# Patient Record
Sex: FEMALE | Race: WHITE | NOT HISPANIC OR LATINO | Employment: OTHER | URBAN - METROPOLITAN AREA
[De-identification: names, ages, dates, MRNs, and addresses within clinical notes are randomized per-mention and may not be internally consistent; named-entity substitution may affect disease eponyms.]

---

## 2017-11-28 PROBLEM — O09.522 ELDERLY MULTIGRAVIDA IN SECOND TRIMESTER: Status: ACTIVE | Noted: 2017-11-28

## 2017-11-28 PROBLEM — O30.042 DICHORIONIC DIAMNIOTIC TWIN PREGNANCY IN SECOND TRIMESTER: Status: ACTIVE | Noted: 2017-11-28

## 2017-12-13 PROBLEM — N89.8 VAGINAL DISCHARGE: Status: ACTIVE | Noted: 2017-12-13

## 2018-01-12 PROBLEM — O16.9 HYPERTENSION AFFECTING PREGNANCY: Status: ACTIVE | Noted: 2018-01-12

## 2018-01-13 PROBLEM — R10.9 ABDOMINAL CRAMPING: Status: ACTIVE | Noted: 2018-01-13

## 2018-01-15 PROBLEM — O16.2 HYPERTENSION AFFECTING PREGNANCY IN SECOND TRIMESTER: Status: ACTIVE | Noted: 2018-01-15

## 2018-01-29 PROBLEM — R80.9 PROTEIN IN URINE: Status: ACTIVE | Noted: 2018-01-29

## 2018-02-05 PROBLEM — O14.90 PRE-ECLAMPSIA: Status: ACTIVE | Noted: 2018-02-05

## 2018-03-02 PROBLEM — O47.9 IRREGULAR CONTRACTIONS: Status: ACTIVE | Noted: 2018-03-02

## 2018-03-14 PROBLEM — I10 HYPERTENSION: Status: ACTIVE | Noted: 2018-03-14

## 2018-10-31 ENCOUNTER — LAB VISIT (OUTPATIENT)
Dept: LAB | Facility: HOSPITAL | Age: 38
End: 2018-10-31
Attending: INTERNAL MEDICINE
Payer: COMMERCIAL

## 2018-10-31 ENCOUNTER — OFFICE VISIT (OUTPATIENT)
Dept: CARDIOLOGY | Facility: CLINIC | Age: 38
End: 2018-10-31
Payer: COMMERCIAL

## 2018-10-31 VITALS
BODY MASS INDEX: 29.95 KG/M2 | WEIGHT: 202.19 LBS | HEART RATE: 90 BPM | DIASTOLIC BLOOD PRESSURE: 74 MMHG | SYSTOLIC BLOOD PRESSURE: 111 MMHG | HEIGHT: 69 IN

## 2018-10-31 DIAGNOSIS — E01.0 THYROMEGALY: ICD-10-CM

## 2018-10-31 DIAGNOSIS — R07.9 CHEST PAIN, UNSPECIFIED TYPE: ICD-10-CM

## 2018-10-31 DIAGNOSIS — R00.2 PALPITATIONS: ICD-10-CM

## 2018-10-31 DIAGNOSIS — I34.1 MITRAL VALVE PROLAPSE: ICD-10-CM

## 2018-10-31 LAB
T4 FREE SERPL-MCNC: 1.04 NG/DL
TSH SERPL DL<=0.005 MIU/L-ACNC: 1.09 UIU/ML

## 2018-10-31 PROCEDURE — 36415 COLL VENOUS BLD VENIPUNCTURE: CPT | Mod: PO

## 2018-10-31 PROCEDURE — 99204 OFFICE O/P NEW MOD 45 MIN: CPT | Mod: S$GLB,,, | Performed by: INTERNAL MEDICINE

## 2018-10-31 PROCEDURE — 93000 ELECTROCARDIOGRAM COMPLETE: CPT | Mod: S$GLB,,, | Performed by: INTERNAL MEDICINE

## 2018-10-31 PROCEDURE — 84443 ASSAY THYROID STIM HORMONE: CPT

## 2018-10-31 PROCEDURE — 99999 PR PBB SHADOW E&M-EST. PATIENT-LVL III: CPT | Mod: PBBFAC,,, | Performed by: INTERNAL MEDICINE

## 2018-10-31 PROCEDURE — 84439 ASSAY OF FREE THYROXINE: CPT

## 2018-10-31 PROCEDURE — 3008F BODY MASS INDEX DOCD: CPT | Mod: CPTII,S$GLB,, | Performed by: INTERNAL MEDICINE

## 2018-10-31 NOTE — PROGRESS NOTES
Subjective:    Patient ID:  Yokasta Pratt is a 38 y.o. female who presents for evaluation of No chief complaint on file.      Here for occasional palpitations. No other cardiac symptoms.         Review of Systems   Cardiovascular: Positive for palpitations.   All other systems reviewed and are negative.       Objective:      Physical Exam   Constitutional: She is oriented to person, place, and time. She appears well-developed and well-nourished.   HENT:   Head: Normocephalic and atraumatic.   Eyes: Conjunctivae are normal. Pupils are equal, round, and reactive to light. Right eye exhibits no discharge. Left eye exhibits no discharge. No scleral icterus.   Neck: Normal range of motion. Neck supple. No JVD present. No tracheal deviation present. Thyromegaly present.   Cardiovascular: Normal rate and regular rhythm. Exam reveals a midsystolic click.   Pulses:       Carotid pulses are 2+ on the right side, and 2+ on the left side.       Dorsalis pedis pulses are 2+ on the right side, and 2+ on the left side.        Posterior tibial pulses are 2+ on the right side, and 2+ on the left side.   Pulmonary/Chest: Effort normal and breath sounds normal. No stridor. No respiratory distress. She has no wheezes. She has no rales.   Abdominal: Soft. Bowel sounds are normal. She exhibits no distension. There is no tenderness. There is no rebound and no guarding.   Musculoskeletal: Normal range of motion. She exhibits no edema or tenderness.   Lymphadenopathy:     She has no cervical adenopathy.   Neurological: She is alert and oriented to person, place, and time.   Skin: Skin is warm and dry. No rash noted. No erythema. No pallor.   Psychiatric: She has a normal mood and affect. Her behavior is normal. Judgment and thought content normal.         Assessment:       1. Palpitations    2. Mitral valve prolapse    3. Thyromegaly         Plan:       Palpitations  -     TSH; Future; Expected date: 10/31/2018  -     T4, free;  Future; Expected date: 10/31/2018  -     Transthoracic echo (TTE) complete; Future    Mitral valve prolapse  -     Transthoracic echo (TTE) complete; Future    Thyromegaly  -     US Soft Tissue Head Neck Thyroid; Future; Expected date: 10/31/2018  -     TSH; Future; Expected date: 10/31/2018  -     T4, free; Future; Expected date: 10/31/2018    RTC in one months.

## 2018-11-07 ENCOUNTER — PATIENT MESSAGE (OUTPATIENT)
Dept: ADMINISTRATIVE | Facility: OTHER | Age: 38
End: 2018-11-07

## 2018-11-08 DIAGNOSIS — R07.9 CHEST PAIN, UNSPECIFIED TYPE: Primary | ICD-10-CM

## 2018-11-13 ENCOUNTER — CLINICAL SUPPORT (OUTPATIENT)
Dept: CARDIOLOGY | Facility: CLINIC | Age: 38
End: 2018-11-13
Attending: INTERNAL MEDICINE
Payer: COMMERCIAL

## 2018-11-13 ENCOUNTER — HOSPITAL ENCOUNTER (OUTPATIENT)
Dept: RADIOLOGY | Facility: HOSPITAL | Age: 38
Discharge: HOME | End: 2018-11-13
Attending: INTERNAL MEDICINE
Payer: COMMERCIAL

## 2018-11-13 VITALS
WEIGHT: 202 LBS | SYSTOLIC BLOOD PRESSURE: 115 MMHG | BODY MASS INDEX: 29.92 KG/M2 | HEART RATE: 80 BPM | DIASTOLIC BLOOD PRESSURE: 60 MMHG | HEIGHT: 69 IN

## 2018-11-13 DIAGNOSIS — E01.0 THYROMEGALY: ICD-10-CM

## 2018-11-13 DIAGNOSIS — R00.2 PALPITATIONS: ICD-10-CM

## 2018-11-13 DIAGNOSIS — I34.1 MITRAL VALVE PROLAPSE: ICD-10-CM

## 2018-11-13 PROCEDURE — 76536 US EXAM OF HEAD AND NECK: CPT | Mod: 26,,, | Performed by: RADIOLOGY

## 2018-11-13 PROCEDURE — 93306 TTE W/DOPPLER COMPLETE: CPT | Mod: S$GLB,,, | Performed by: INTERNAL MEDICINE

## 2018-11-13 PROCEDURE — 76536 US EXAM OF HEAD AND NECK: CPT | Mod: TC,PO

## 2018-11-13 PROCEDURE — 99999 PR PBB SHADOW E&M-EST. PATIENT-LVL II: CPT | Mod: PBBFAC,,,

## 2018-11-14 LAB
ASCENDING AORTA: 2.15 CM
AV MEAN GRADIENT: 3.96 MMHG
AV PEAK GRADIENT: 7.4 MMHG
BSA FOR ECHO PROCEDURE: 2.11 M2
CV ECHO LV RWT: 0.32 CM
DOP CALC AO PEAK VEL: 1.36 M/S
DOP CALC LVOT AREA: 3.49 CM2
DOP CALC LVOT DIAMETER: 2.11 CM
DOP CALC LVOT STROKE VOLUME: 48.54 CM3
DOP CALCLVOT PEAK VEL VTI: 13.89 CM
E WAVE DECELERATION TIME: 157.71 MSEC
E/A RATIO: 1.33
E/E' RATIO: 8
ECHO LV POSTERIOR WALL: 0.77 CM (ref 0.6–1.1)
FRACTIONAL SHORTENING: 30 % (ref 28–44)
INTERVENTRICULAR SEPTUM: 1.05 CM (ref 0.6–1.1)
IVRT: 0.09 MSEC
LA MAJOR: 4.21 CM
LA MINOR: 3.69 CM
LA WIDTH: 2.64 CM
LEFT ATRIUM SIZE: 3.17 CM
LEFT ATRIUM VOLUME INDEX: 13.3 ML/M2
LEFT ATRIUM VOLUME: 27.98 CM3
LEFT INTERNAL DIMENSION IN SYSTOLE: 3.36 CM (ref 2.1–4)
LEFT VENTRICLE DIASTOLIC VOLUME INDEX: 51.31 ML/M2
LEFT VENTRICLE DIASTOLIC VOLUME: 108.27 ML
LEFT VENTRICLE MASS INDEX: 71.3 G/M2
LEFT VENTRICLE SYSTOLIC VOLUME INDEX: 21.8 ML/M2
LEFT VENTRICLE SYSTOLIC VOLUME: 46.08 ML
LEFT VENTRICULAR INTERNAL DIMENSION IN DIASTOLE: 4.81 CM (ref 3.5–6)
LEFT VENTRICULAR MASS: 150.48 G
LV LATERAL E/E' RATIO: 6.8
LV SEPTAL E/E' RATIO: 9.71
MV PEAK A VEL: 0.51 M/S
MV PEAK E VEL: 0.68 M/S
PISA TR MAX VEL: 2.42 M/S
PULM VEIN S/D RATIO: 0.97
PV PEAK D VEL: 0.38 M/S
PV PEAK S VEL: 0.37 M/S
RA MAJOR: 4.29 CM
RA PRESSURE: 3 MMHG
RA WIDTH: 3.16 CM
RIGHT VENTRICULAR END-DIASTOLIC DIMENSION: 2.98 CM
RV TISSUE DOPPLER FREE WALL SYSTOLIC VELOCITY 1 (APICAL 4 CHAMBER VIEW): 9.64 M/S
SINUS: 2.83 CM
STJ: 2.26 CM
TDI LATERAL: 0.1
TDI SEPTAL: 0.07
TDI: 0.09
TR MAX PG: 23.43 MMHG
TRICUSPID ANNULAR PLANE SYSTOLIC EXCURSION: 1.59 CM
TV REST PULMONARY ARTERY PRESSURE: 26.43 MMHG

## 2018-11-15 ENCOUNTER — PATIENT MESSAGE (OUTPATIENT)
Dept: ADMINISTRATIVE | Facility: OTHER | Age: 38
End: 2018-11-15

## 2019-02-27 ENCOUNTER — LAB VISIT (OUTPATIENT)
Dept: LAB | Facility: HOSPITAL | Age: 39
End: 2019-02-27
Attending: EMERGENCY MEDICINE
Payer: COMMERCIAL

## 2019-02-27 DIAGNOSIS — E04.1 THYROID NODULE: ICD-10-CM

## 2019-02-27 DIAGNOSIS — E07.9 THYROID DYSFUNCTION: ICD-10-CM

## 2019-02-27 DIAGNOSIS — R53.83 FATIGUE: ICD-10-CM

## 2019-02-27 DIAGNOSIS — R00.2 PALPITATIONS: ICD-10-CM

## 2019-02-27 DIAGNOSIS — F41.9 ANXIETY: Primary | ICD-10-CM

## 2019-02-27 DIAGNOSIS — E34.9 HORMONE DISTURBANCE: ICD-10-CM

## 2019-02-27 LAB
25(OH)D3+25(OH)D2 SERPL-MCNC: 25 NG/ML
ALBUMIN SERPL BCP-MCNC: 4 G/DL
ALP SERPL-CCNC: 71 U/L
ALT SERPL W/O P-5'-P-CCNC: 27 U/L
ANION GAP SERPL CALC-SCNC: 6 MMOL/L
AST SERPL-CCNC: 21 U/L
BACTERIA #/AREA URNS HPF: ABNORMAL /HPF
BASOPHILS # BLD AUTO: 0.05 K/UL
BASOPHILS NFR BLD: 0.6 %
BILIRUB SERPL-MCNC: 0.9 MG/DL
BILIRUB UR QL STRIP: NEGATIVE
BUN SERPL-MCNC: 15 MG/DL
CALCIUM SERPL-MCNC: 9 MG/DL
CHLORIDE SERPL-SCNC: 106 MMOL/L
CHOLEST SERPL-MCNC: 189 MG/DL
CHOLEST/HDLC SERPL: 3.6 {RATIO}
CLARITY UR: CLEAR
CO2 SERPL-SCNC: 28 MMOL/L
COLOR UR: YELLOW
CORTIS SERPL-MCNC: 10.4 UG/DL
CREAT SERPL-MCNC: 0.7 MG/DL
DHEA-S SERPL-MCNC: 81.8 UG/DL
DIFFERENTIAL METHOD: NORMAL
EOSINOPHIL # BLD AUTO: 0.5 K/UL
EOSINOPHIL NFR BLD: 6 %
ERYTHROCYTE [DISTWIDTH] IN BLOOD BY AUTOMATED COUNT: 12.8 %
EST. GFR  (AFRICAN AMERICAN): >60 ML/MIN/1.73 M^2
EST. GFR  (NON AFRICAN AMERICAN): >60 ML/MIN/1.73 M^2
ESTIMATED AVG GLUCOSE: 103 MG/DL
ESTRADIOL SERPL-MCNC: 172 PG/ML
FERRITIN SERPL-MCNC: 32 NG/ML
FOLATE SERPL-MCNC: 14.6 NG/ML
FSH SERPL-ACNC: 6.5 MIU/ML
GLUCOSE SERPL-MCNC: 94 MG/DL
GLUCOSE UR QL STRIP: NEGATIVE
HBA1C MFR BLD HPLC: 5.2 %
HCT VFR BLD AUTO: 44.6 %
HDLC SERPL-MCNC: 53 MG/DL
HDLC SERPL: 28 %
HGB BLD-MCNC: 14.6 G/DL
HGB UR QL STRIP: NEGATIVE
IMM GRANULOCYTES # BLD AUTO: 0.02 K/UL
IMM GRANULOCYTES NFR BLD AUTO: 0.2 %
INSULIN COLLECTION INTERVAL: NORMAL
INSULIN SERPL-ACNC: 6.7 UU/ML
IRON SERPL-MCNC: 48 UG/DL
KETONES UR QL STRIP: NEGATIVE
LDLC SERPL CALC-MCNC: 124.2 MG/DL
LEUKOCYTE ESTERASE UR QL STRIP: NEGATIVE
LH SERPL-ACNC: 17.9 MIU/ML
LYMPHOCYTES # BLD AUTO: 3.1 K/UL
LYMPHOCYTES NFR BLD: 35.8 %
MAGNESIUM SERPL-MCNC: 2 MG/DL
MCH RBC QN AUTO: 28.4 PG
MCHC RBC AUTO-ENTMCNC: 32.7 G/DL
MCV RBC AUTO: 87 FL
MICROSCOPIC COMMENT: ABNORMAL
MONOCYTES # BLD AUTO: 0.5 K/UL
MONOCYTES NFR BLD: 5.6 %
NEUTROPHILS # BLD AUTO: 4.4 K/UL
NEUTROPHILS NFR BLD: 51.8 %
NITRITE UR QL STRIP: NEGATIVE
NONHDLC SERPL-MCNC: 136 MG/DL
NRBC BLD-RTO: 0 /100 WBC
PH UR STRIP: 8 [PH] (ref 5–8)
PHOSPHATE SERPL-MCNC: 3.1 MG/DL
PLATELET # BLD AUTO: 231 K/UL
PMV BLD AUTO: 10.9 FL
POTASSIUM SERPL-SCNC: 4.2 MMOL/L
PROGEST SERPL-MCNC: 0.3 NG/ML
PROLACTIN SERPL IA-MCNC: 6.9 NG/ML
PROT SERPL-MCNC: 6.5 G/DL
PROT UR QL STRIP: NEGATIVE
RBC # BLD AUTO: 5.14 M/UL
SODIUM SERPL-SCNC: 140 MMOL/L
SP GR UR STRIP: 1.01 (ref 1–1.03)
SQUAMOUS #/AREA URNS HPF: 10 /HPF
T3FREE SERPL-MCNC: 2.6 PG/ML
T4 FREE SERPL-MCNC: 1.17 NG/DL
TRIGL SERPL-MCNC: 59 MG/DL
TSH SERPL DL<=0.005 MIU/L-ACNC: 1.35 UIU/ML
URN SPEC COLLECT METH UR: NORMAL
VIT B12 SERPL-MCNC: 745 PG/ML
WBC # BLD AUTO: 8.51 K/UL
WBC #/AREA URNS HPF: 1 /HPF (ref 0–5)

## 2019-02-27 PROCEDURE — 80053 COMPREHEN METABOLIC PANEL: CPT

## 2019-02-27 PROCEDURE — 84144 ASSAY OF PROGESTERONE: CPT

## 2019-02-27 PROCEDURE — 82607 VITAMIN B-12: CPT

## 2019-02-27 PROCEDURE — 83735 ASSAY OF MAGNESIUM: CPT

## 2019-02-27 PROCEDURE — 84143 ASSAY OF 17-HYDROXYPREGNENO: CPT

## 2019-02-27 PROCEDURE — 82728 ASSAY OF FERRITIN: CPT

## 2019-02-27 PROCEDURE — 82040 ASSAY OF SERUM ALBUMIN: CPT

## 2019-02-27 PROCEDURE — 86800 THYROGLOBULIN ANTIBODY: CPT

## 2019-02-27 PROCEDURE — 83036 HEMOGLOBIN GLYCOSYLATED A1C: CPT

## 2019-02-27 PROCEDURE — 84100 ASSAY OF PHOSPHORUS: CPT

## 2019-02-27 PROCEDURE — 84270 ASSAY OF SEX HORMONE GLOBUL: CPT

## 2019-02-27 PROCEDURE — 84146 ASSAY OF PROLACTIN: CPT

## 2019-02-27 PROCEDURE — 82627 DEHYDROEPIANDROSTERONE: CPT

## 2019-02-27 PROCEDURE — 82746 ASSAY OF FOLIC ACID SERUM: CPT

## 2019-02-27 PROCEDURE — 84443 ASSAY THYROID STIM HORMONE: CPT

## 2019-02-27 PROCEDURE — 83525 ASSAY OF INSULIN: CPT

## 2019-02-27 PROCEDURE — 83002 ASSAY OF GONADOTROPIN (LH): CPT

## 2019-02-27 PROCEDURE — 81000 URINALYSIS NONAUTO W/SCOPE: CPT | Mod: PO

## 2019-02-27 PROCEDURE — 80061 LIPID PANEL: CPT

## 2019-02-27 PROCEDURE — 82306 VITAMIN D 25 HYDROXY: CPT

## 2019-02-27 PROCEDURE — 84439 ASSAY OF FREE THYROXINE: CPT

## 2019-02-27 PROCEDURE — 86376 MICROSOMAL ANTIBODY EACH: CPT

## 2019-02-27 PROCEDURE — 82670 ASSAY OF TOTAL ESTRADIOL: CPT

## 2019-02-27 PROCEDURE — 82533 TOTAL CORTISOL: CPT

## 2019-02-27 PROCEDURE — 83540 ASSAY OF IRON: CPT

## 2019-02-27 PROCEDURE — 84481 FREE ASSAY (FT-3): CPT

## 2019-02-27 PROCEDURE — 83001 ASSAY OF GONADOTROPIN (FSH): CPT

## 2019-02-27 PROCEDURE — 85025 COMPLETE CBC W/AUTO DIFF WBC: CPT

## 2019-02-27 PROCEDURE — 82679 ASSAY OF ESTRONE: CPT

## 2019-02-28 LAB
THYROGLOB AB SERPL IA-ACNC: <4 IU/ML
THYROPEROXIDASE IGG SERPL-ACNC: <6 IU/ML

## 2019-03-03 LAB
ALBUMIN SERPL-MCNC: 4.3 G/DL (ref 3.6–5.1)
SHBG SERPL-SCNC: 49 NMOL/L (ref 17–124)
TESTOST FREE SERPL-MCNC: 1.7 PG/ML (ref 0.2–5)
TESTOST SERPL-MCNC: 20 NG/DL (ref 2–45)
TESTOSTERONE.FREE+WB SERPL-MCNC: 3.4 NG/DL (ref 0.5–8.5)

## 2019-03-04 LAB — SHBG SERPL-SCNC: 54 NMOL/L

## 2019-03-06 LAB
17OH-PREG SERPL-MCNC: 214 NG/DL
ESTRONE SERPL-MCNC: 53 PG/ML

## 2019-11-15 ENCOUNTER — LAB VISIT (OUTPATIENT)
Dept: LAB | Facility: HOSPITAL | Age: 39
End: 2019-11-15
Attending: NURSE PRACTITIONER
Payer: COMMERCIAL

## 2019-11-15 ENCOUNTER — HOSPITAL ENCOUNTER (OUTPATIENT)
Dept: RADIOLOGY | Facility: HOSPITAL | Age: 39
Discharge: HOME | End: 2019-11-15
Attending: NURSE PRACTITIONER
Payer: COMMERCIAL

## 2019-11-15 DIAGNOSIS — R31.9 HEMATURIA, UNSPECIFIED TYPE: ICD-10-CM

## 2019-11-15 DIAGNOSIS — N30.00 ACUTE CYSTITIS WITHOUT HEMATURIA: ICD-10-CM

## 2019-11-15 DIAGNOSIS — R31.9 HEMATURIA, UNSPECIFIED TYPE: Primary | ICD-10-CM

## 2019-11-15 DIAGNOSIS — R30.0 DYSURIA: ICD-10-CM

## 2019-11-15 DIAGNOSIS — R30.0 DYSURIA: Primary | ICD-10-CM

## 2019-11-15 LAB
BACTERIA #/AREA URNS HPF: ABNORMAL /HPF
BILIRUB UR QL STRIP: NEGATIVE
CLARITY UR: CLEAR
COLOR UR: YELLOW
GLUCOSE UR QL STRIP: NEGATIVE
HGB UR QL STRIP: ABNORMAL
KETONES UR QL STRIP: NEGATIVE
LEUKOCYTE ESTERASE UR QL STRIP: NEGATIVE
MICROSCOPIC COMMENT: ABNORMAL
NITRITE UR QL STRIP: NEGATIVE
PH UR STRIP: 8 [PH] (ref 5–8)
PROT UR QL STRIP: NEGATIVE
RBC #/AREA URNS HPF: 10 /HPF (ref 0–4)
SP GR UR STRIP: 1.01 (ref 1–1.03)
SQUAMOUS #/AREA URNS HPF: 3 /HPF
URN SPEC COLLECT METH UR: ABNORMAL

## 2019-11-15 PROCEDURE — 76770 US RETROPERITONEAL COMPLETE: ICD-10-PCS | Mod: 26,,, | Performed by: RADIOLOGY

## 2019-11-15 PROCEDURE — 81000 URINALYSIS NONAUTO W/SCOPE: CPT | Mod: PO

## 2019-11-15 PROCEDURE — 76770 US EXAM ABDO BACK WALL COMP: CPT | Mod: TC,PO

## 2019-11-15 PROCEDURE — 87086 URINE CULTURE/COLONY COUNT: CPT

## 2019-11-15 PROCEDURE — 76770 US EXAM ABDO BACK WALL COMP: CPT | Mod: 26,,, | Performed by: RADIOLOGY

## 2019-11-15 RX ORDER — CIPROFLOXACIN 500 MG/1
500 TABLET ORAL EVERY 12 HOURS
Qty: 10 TABLET | Refills: 0 | Status: SHIPPED | OUTPATIENT
Start: 2019-11-15 | End: 2020-12-01

## 2019-11-17 LAB
BACTERIA UR CULT: NORMAL
BACTERIA UR CULT: NORMAL

## 2019-11-18 DIAGNOSIS — R31.9 HEMATURIA, UNSPECIFIED TYPE: Primary | ICD-10-CM

## 2020-03-19 ENCOUNTER — HOSPITAL ENCOUNTER (OUTPATIENT)
Dept: RADIOLOGY | Facility: HOSPITAL | Age: 40
Discharge: HOME | End: 2020-03-19
Attending: OBSTETRICS & GYNECOLOGY
Payer: COMMERCIAL

## 2020-03-19 VITALS — BODY MASS INDEX: 29.71 KG/M2 | HEIGHT: 69 IN | WEIGHT: 200.63 LBS

## 2020-03-19 DIAGNOSIS — N64.4 MASTODYNIA: ICD-10-CM

## 2020-03-19 DIAGNOSIS — N63.0 LUMP OR MASS IN BREAST: ICD-10-CM

## 2020-03-19 PROCEDURE — 76642 ULTRASOUND BREAST LIMITED: CPT | Mod: TC,PO,RT

## 2020-03-19 PROCEDURE — G0279 TOMOSYNTHESIS, MAMMO: HCPCS | Mod: 26,,, | Performed by: RADIOLOGY

## 2020-03-19 PROCEDURE — 77066 MAMMO DIGITAL DIAGNOSTIC BILAT WITH TOMOSYNTHESIS_CAD: ICD-10-PCS | Mod: 26,,, | Performed by: RADIOLOGY

## 2020-03-19 PROCEDURE — 76642 US BREAST RIGHT LIMITED: ICD-10-PCS | Mod: 26,RT,, | Performed by: RADIOLOGY

## 2020-03-19 PROCEDURE — 76642 ULTRASOUND BREAST LIMITED: CPT | Mod: 26,RT,, | Performed by: RADIOLOGY

## 2020-03-19 PROCEDURE — 77062 BREAST TOMOSYNTHESIS BI: CPT | Mod: TC,PO

## 2020-03-19 PROCEDURE — G0279 MAMMO DIGITAL DIAGNOSTIC BILAT WITH TOMOSYNTHESIS_CAD: ICD-10-PCS | Mod: 26,,, | Performed by: RADIOLOGY

## 2020-03-19 PROCEDURE — 77066 DX MAMMO INCL CAD BI: CPT | Mod: 26,,, | Performed by: RADIOLOGY

## 2020-06-25 ENCOUNTER — LAB VISIT (OUTPATIENT)
Dept: PRIMARY CARE CLINIC | Facility: OTHER | Age: 40
End: 2020-06-25
Payer: COMMERCIAL

## 2020-06-25 DIAGNOSIS — R50.9 FEVER, UNKNOWN ORIGIN: Primary | ICD-10-CM

## 2020-06-25 DIAGNOSIS — R05.9 COUGH: ICD-10-CM

## 2020-06-25 PROCEDURE — U0003 INFECTIOUS AGENT DETECTION BY NUCLEIC ACID (DNA OR RNA); SEVERE ACUTE RESPIRATORY SYNDROME CORONAVIRUS 2 (SARS-COV-2) (CORONAVIRUS DISEASE [COVID-19]), AMPLIFIED PROBE TECHNIQUE, MAKING USE OF HIGH THROUGHPUT TECHNOLOGIES AS DESCRIBED BY CMS-2020-01-R: HCPCS

## 2020-06-28 DIAGNOSIS — U07.1 COVID-19 VIRUS DETECTED: ICD-10-CM

## 2020-06-28 LAB — SARS-COV-2 RNA RESP QL NAA+PROBE: DETECTED

## 2020-11-30 DIAGNOSIS — M79.671 RIGHT FOOT PAIN: Primary | ICD-10-CM

## 2020-12-01 ENCOUNTER — HOSPITAL ENCOUNTER (OUTPATIENT)
Dept: RADIOLOGY | Facility: HOSPITAL | Age: 40
Discharge: HOME | End: 2020-12-01
Attending: ORTHOPAEDIC SURGERY
Payer: COMMERCIAL

## 2020-12-01 ENCOUNTER — OFFICE VISIT (OUTPATIENT)
Dept: ORTHOPEDICS | Facility: CLINIC | Age: 40
End: 2020-12-01
Payer: COMMERCIAL

## 2020-12-01 VITALS
HEART RATE: 90 BPM | DIASTOLIC BLOOD PRESSURE: 82 MMHG | HEIGHT: 69 IN | BODY MASS INDEX: 32.58 KG/M2 | SYSTOLIC BLOOD PRESSURE: 124 MMHG | WEIGHT: 220 LBS

## 2020-12-01 DIAGNOSIS — G57.61 MORTON'S NEUROMA OF SECOND INTERSPACE OF RIGHT FOOT: Primary | ICD-10-CM

## 2020-12-01 DIAGNOSIS — M79.671 RIGHT FOOT PAIN: ICD-10-CM

## 2020-12-01 PROCEDURE — 99203 OFFICE O/P NEW LOW 30 MIN: CPT | Mod: 25,S$GLB,, | Performed by: ORTHOPAEDIC SURGERY

## 2020-12-01 PROCEDURE — 3008F PR BODY MASS INDEX (BMI) DOCUMENTED: ICD-10-PCS | Mod: CPTII,S$GLB,, | Performed by: ORTHOPAEDIC SURGERY

## 2020-12-01 PROCEDURE — 64455 PR INJECT ANES/STEROID PLANTAR COMMON DIGITAL NERVE: ICD-10-PCS | Mod: RT,S$GLB,, | Performed by: ORTHOPAEDIC SURGERY

## 2020-12-01 PROCEDURE — 1125F PR PAIN SEVERITY QUANTIFIED, PAIN PRESENT: ICD-10-PCS | Mod: S$GLB,,, | Performed by: ORTHOPAEDIC SURGERY

## 2020-12-01 PROCEDURE — 73630 X-RAY EXAM OF FOOT: CPT | Mod: TC,PO,RT

## 2020-12-01 PROCEDURE — 73630 X-RAY EXAM OF FOOT: CPT | Mod: 26,RT,, | Performed by: RADIOLOGY

## 2020-12-01 PROCEDURE — 64455 NJX AA&/STRD PLTR COM DG NRV: CPT | Mod: RT,S$GLB,, | Performed by: ORTHOPAEDIC SURGERY

## 2020-12-01 PROCEDURE — 1125F AMNT PAIN NOTED PAIN PRSNT: CPT | Mod: S$GLB,,, | Performed by: ORTHOPAEDIC SURGERY

## 2020-12-01 PROCEDURE — 99999 PR PBB SHADOW E&M-EST. PATIENT-LVL III: CPT | Mod: PBBFAC,,, | Performed by: ORTHOPAEDIC SURGERY

## 2020-12-01 PROCEDURE — 73630 XR FOOT COMPLETE 3 VIEW RIGHT: ICD-10-PCS | Mod: 26,RT,, | Performed by: RADIOLOGY

## 2020-12-01 PROCEDURE — 99999 PR PBB SHADOW E&M-EST. PATIENT-LVL III: ICD-10-PCS | Mod: PBBFAC,,, | Performed by: ORTHOPAEDIC SURGERY

## 2020-12-01 PROCEDURE — 99203 PR OFFICE/OUTPT VISIT, NEW, LEVL III, 30-44 MIN: ICD-10-PCS | Mod: 25,S$GLB,, | Performed by: ORTHOPAEDIC SURGERY

## 2020-12-01 PROCEDURE — 3008F BODY MASS INDEX DOCD: CPT | Mod: CPTII,S$GLB,, | Performed by: ORTHOPAEDIC SURGERY

## 2020-12-01 RX ORDER — TRIAMCINOLONE ACETONIDE 40 MG/ML
40 INJECTION, SUSPENSION INTRA-ARTICULAR; INTRAMUSCULAR ONCE
Qty: 0.5 ML | Refills: 0 | Status: SHIPPED | OUTPATIENT
Start: 2020-12-01 | End: 2020-12-01

## 2020-12-01 RX ORDER — DULOXETIN HYDROCHLORIDE 60 MG/1
50 CAPSULE, DELAYED RELEASE ORAL DAILY
COMMUNITY
End: 2023-04-27

## 2020-12-01 RX ORDER — CETIRIZINE HYDROCHLORIDE 10 MG/1
10 TABLET ORAL DAILY
COMMUNITY

## 2020-12-01 RX ORDER — TRIAMCINOLONE ACETONIDE 40 MG/ML
40 INJECTION, SUSPENSION INTRA-ARTICULAR; INTRAMUSCULAR
Status: COMPLETED | OUTPATIENT
Start: 2020-12-01 | End: 2020-12-01

## 2020-12-01 RX ADMIN — TRIAMCINOLONE ACETONIDE 40 MG: 40 INJECTION, SUSPENSION INTRA-ARTICULAR; INTRAMUSCULAR at 04:12

## 2020-12-01 NOTE — PROGRESS NOTES
"  HPI: Yokasta Pratt is a 36 y.o. female who is a previous patient of mine with right foot pain but I have not seen her in about 4 years.  I did an injection for her right foot at that visit which she said help tremendously and probably lasted until 9/2020. She had twin girls about 2 1/2 years ago and even when she was pregnant she didn't have pain in the foot, but she was on bed rest for about 3 months. The pain is worse with activity. She rates her pain as 4/10 today. The pain is intermittent.   She is here with her , Dao, today.     PAST MEDICAL/SURGICAL/FAMILY/SOCIAL/ HISTORY: REVIEWED    ALLERGIES/MEDICATIONS: REVIEWED          PHYSICAL EXAM:  There were no vitals filed for this visit.      Ht Readings from Last 1 Encounters:   11/15/16 5' 9" (1.753 m)          Wt Readings from Last 1 Encounters:   11/15/16 103.6 kg (228 lb 6.4 oz)        GENERAL: Well developed, well nourished, no acute distress. Very, very pleasant.   SKIN: Skin is intact. No atrophy, abrasions or lesions are noted.   Neurological: Normal mental status. Appropriate and conversant. Alert and oriented x 3.  GAIT: Walks with a non-antalgic gait.     Right lower extremity:  2+ dorsalis pedis pulse.  Capillary refill < 3 seconds.  Normal range of motion tibiotalar and subtalar joints. Normal alignment of the forefoot and the hindfoot.  5/5 strength EHL, FHL, tibialis anterior, gastrocsoleus, tibialis posterior and peroneals. Sensation to light touch intact sural, saphenous, superficial peroneal and deep peroneal nerves. No swelling, ecchymosis or deformity. No lymphadenopathy, no masses or tumors palpated.   tenderness to palpation 2nd webspace. No  tenderness to palpation under 2nd metatarsal head. + marcin's test.          XRAYS:   3 views of right foot fobtained and reviewed today reveal No evidence of fractures or dislocations.         ASSESSMENT:        PLAN:  Brief Procedure:  A steroid injection was performed using " sterile technique at the right 2nd webspace using 1ml of 1% plain Lidocaine, 1ml 0.25%  Marcaine plain and1ml of Kenalog. This was well tolerated and bandaid was placed.     I can repeat injections every 6 months to a year or prn as needed.  We discussed surgical treatment with bernard's neuroma excision if injection fails to provide lasting relief of symptoms. F/u prn.

## 2020-12-01 NOTE — LETTER
December 1, 2020      [facility]            [facility]  [address] [place] [address]  Phone: [phone]          Patient: Yokasta Pratt   MR Number: [id number]   YOB: 1980   Date of Visit: 12/1/2020       Dear [facility] :    Thank you for referring Yokasta Pratt to me for evaluation. Attached you will find relevant portions of my assessment and plan of care.    If you have questions, please do not hesitate to call me. I look forward to following Yokasta Pratt along with you.    Sincerely,    Catarino Myers MD    Enclosure  CC:  No Recipients    If you would like to receive this communication electronically, please contact [email or web address] or [phone] to request more information on [employer] access.    For providers and/or their staff who would like to refer a patient to [facility], please contact us through our one-stop-shop provider referral line, Charles Childs, at [phone].    If you feel you have received this communication in error or would no longer like to receive these types of communications, please e-mail [email or web address]         
Alert and oriented to person, place and time. Normal mood and affect, no apparent risk to self or others

## 2021-04-13 ENCOUNTER — OFFICE VISIT (OUTPATIENT)
Dept: PHYSICAL MEDICINE AND REHAB | Facility: CLINIC | Age: 41
End: 2021-04-13
Payer: COMMERCIAL

## 2021-04-13 VITALS — WEIGHT: 220 LBS | HEIGHT: 69 IN | BODY MASS INDEX: 32.58 KG/M2

## 2021-04-13 DIAGNOSIS — M77.41 METATARSALGIA OF RIGHT FOOT: Primary | ICD-10-CM

## 2021-04-13 PROCEDURE — 64455 NJX AA&/STRD PLTR COM DG NRV: CPT | Mod: RT,S$GLB,, | Performed by: PHYSICAL MEDICINE & REHABILITATION

## 2021-04-13 PROCEDURE — 99999 PR PBB SHADOW E&M-EST. PATIENT-LVL III: ICD-10-PCS | Mod: PBBFAC,,, | Performed by: PHYSICAL MEDICINE & REHABILITATION

## 2021-04-13 PROCEDURE — 99999 PR PBB SHADOW E&M-EST. PATIENT-LVL III: CPT | Mod: PBBFAC,,, | Performed by: PHYSICAL MEDICINE & REHABILITATION

## 2021-04-13 PROCEDURE — 64455 PR INJECT ANES/STEROID PLANTAR COMMON DIGITAL NERVE: ICD-10-PCS | Mod: RT,S$GLB,, | Performed by: PHYSICAL MEDICINE & REHABILITATION

## 2021-04-13 PROCEDURE — 99202 PR OFFICE/OUTPT VISIT, NEW, LEVL II, 15-29 MIN: ICD-10-PCS | Mod: 25,S$GLB,, | Performed by: PHYSICAL MEDICINE & REHABILITATION

## 2021-04-13 PROCEDURE — 3008F BODY MASS INDEX DOCD: CPT | Mod: CPTII,S$GLB,, | Performed by: PHYSICAL MEDICINE & REHABILITATION

## 2021-04-13 PROCEDURE — 3008F PR BODY MASS INDEX (BMI) DOCUMENTED: ICD-10-PCS | Mod: CPTII,S$GLB,, | Performed by: PHYSICAL MEDICINE & REHABILITATION

## 2021-04-13 PROCEDURE — 99202 OFFICE O/P NEW SF 15 MIN: CPT | Mod: 25,S$GLB,, | Performed by: PHYSICAL MEDICINE & REHABILITATION

## 2021-04-13 PROCEDURE — 76942 PR U/S GUIDANCE FOR NEEDLE GUIDANCE: ICD-10-PCS | Mod: S$GLB,,, | Performed by: PHYSICAL MEDICINE & REHABILITATION

## 2021-04-13 PROCEDURE — 1125F AMNT PAIN NOTED PAIN PRSNT: CPT | Mod: S$GLB,,, | Performed by: PHYSICAL MEDICINE & REHABILITATION

## 2021-04-13 PROCEDURE — 1125F PR PAIN SEVERITY QUANTIFIED, PAIN PRESENT: ICD-10-PCS | Mod: S$GLB,,, | Performed by: PHYSICAL MEDICINE & REHABILITATION

## 2021-04-13 PROCEDURE — 76942 ECHO GUIDE FOR BIOPSY: CPT | Mod: S$GLB,,, | Performed by: PHYSICAL MEDICINE & REHABILITATION

## 2021-04-13 RX ADMIN — DEXAMETHASONE SODIUM PHOSPHATE 4 MG: 4 INJECTION, SOLUTION INTRA-ARTICULAR; INTRALESIONAL; INTRAMUSCULAR; INTRAVENOUS; SOFT TISSUE at 09:04

## 2021-04-17 RX ORDER — DEXAMETHASONE SODIUM PHOSPHATE 4 MG/ML
4 INJECTION, SOLUTION INTRA-ARTICULAR; INTRALESIONAL; INTRAMUSCULAR; INTRAVENOUS; SOFT TISSUE
Status: DISCONTINUED | OUTPATIENT
Start: 2021-04-13 | End: 2021-04-17 | Stop reason: HOSPADM

## 2021-05-10 ENCOUNTER — PATIENT MESSAGE (OUTPATIENT)
Dept: RESEARCH | Facility: HOSPITAL | Age: 41
End: 2021-05-10

## 2021-08-10 ENCOUNTER — IMMUNIZATION (OUTPATIENT)
Dept: FAMILY MEDICINE | Facility: CLINIC | Age: 41
End: 2021-08-10
Payer: COMMERCIAL

## 2021-08-10 DIAGNOSIS — Z23 NEED FOR VACCINATION: Primary | ICD-10-CM

## 2021-08-10 PROCEDURE — 0001A COVID-19, MRNA, LNP-S, PF, 30 MCG/0.3 ML DOSE VACCINE: CPT | Mod: CV19,,, | Performed by: FAMILY MEDICINE

## 2021-08-10 PROCEDURE — 91300 COVID-19, MRNA, LNP-S, PF, 30 MCG/0.3 ML DOSE VACCINE: ICD-10-PCS | Mod: ,,, | Performed by: FAMILY MEDICINE

## 2021-08-10 PROCEDURE — 0001A COVID-19, MRNA, LNP-S, PF, 30 MCG/0.3 ML DOSE VACCINE: ICD-10-PCS | Mod: CV19,,, | Performed by: FAMILY MEDICINE

## 2021-08-10 PROCEDURE — 91300 COVID-19, MRNA, LNP-S, PF, 30 MCG/0.3 ML DOSE VACCINE: CPT | Mod: ,,, | Performed by: FAMILY MEDICINE

## 2021-11-03 ENCOUNTER — HOSPITAL ENCOUNTER (OUTPATIENT)
Dept: RADIOLOGY | Facility: HOSPITAL | Age: 41
Discharge: HOME | End: 2021-11-03
Attending: NURSE PRACTITIONER
Payer: COMMERCIAL

## 2021-11-03 DIAGNOSIS — Z12.31 ENCOUNTER FOR SCREENING MAMMOGRAM FOR MALIGNANT NEOPLASM OF BREAST: ICD-10-CM

## 2021-11-03 PROCEDURE — 77063 BREAST TOMOSYNTHESIS BI: CPT | Mod: 26,,, | Performed by: RADIOLOGY

## 2021-11-03 PROCEDURE — 77067 MAMMO DIGITAL SCREENING BILAT WITH TOMO: ICD-10-PCS | Mod: 26,,, | Performed by: RADIOLOGY

## 2021-11-03 PROCEDURE — 77067 SCR MAMMO BI INCL CAD: CPT | Mod: TC,PO

## 2021-11-03 PROCEDURE — 77067 SCR MAMMO BI INCL CAD: CPT | Mod: 26,,, | Performed by: RADIOLOGY

## 2021-11-03 PROCEDURE — 77063 MAMMO DIGITAL SCREENING BILAT WITH TOMO: ICD-10-PCS | Mod: 26,,, | Performed by: RADIOLOGY

## 2021-11-30 DIAGNOSIS — R20.0 NUMBNESS AND TINGLING: Primary | ICD-10-CM

## 2021-11-30 DIAGNOSIS — R20.2 NUMBNESS AND TINGLING: Primary | ICD-10-CM

## 2021-12-08 ENCOUNTER — OFFICE VISIT (OUTPATIENT)
Dept: PHYSICAL MEDICINE AND REHAB | Facility: CLINIC | Age: 41
End: 2021-12-08
Payer: COMMERCIAL

## 2021-12-08 VITALS — BODY MASS INDEX: 32.58 KG/M2 | WEIGHT: 220 LBS | HEIGHT: 69 IN

## 2021-12-08 DIAGNOSIS — G56.03 CARPAL TUNNEL SYNDROME ON BOTH SIDES: Primary | ICD-10-CM

## 2021-12-08 DIAGNOSIS — R20.2 NUMBNESS AND TINGLING: ICD-10-CM

## 2021-12-08 DIAGNOSIS — R20.0 NUMBNESS AND TINGLING: ICD-10-CM

## 2021-12-08 PROCEDURE — 99999 PR PBB SHADOW E&M-EST. PATIENT-LVL III: CPT | Mod: PBBFAC,,, | Performed by: PHYSICAL MEDICINE & REHABILITATION

## 2021-12-08 PROCEDURE — 95886 PR EMG COMPLETE, W/ NERVE CONDUCTION STUDIES, 5+ MUSCLES: ICD-10-PCS | Mod: S$GLB,,, | Performed by: PHYSICAL MEDICINE & REHABILITATION

## 2021-12-08 PROCEDURE — 99499 UNLISTED E&M SERVICE: CPT | Mod: S$GLB,,, | Performed by: PHYSICAL MEDICINE & REHABILITATION

## 2021-12-08 PROCEDURE — 99999 PR PBB SHADOW E&M-EST. PATIENT-LVL III: ICD-10-PCS | Mod: PBBFAC,,, | Performed by: PHYSICAL MEDICINE & REHABILITATION

## 2021-12-08 PROCEDURE — 99499 NO LOS: ICD-10-PCS | Mod: S$GLB,,, | Performed by: PHYSICAL MEDICINE & REHABILITATION

## 2021-12-08 PROCEDURE — 95910 PR NERVE CONDUCTION STUDY; 7-8 STUDIES: ICD-10-PCS | Mod: S$GLB,,, | Performed by: PHYSICAL MEDICINE & REHABILITATION

## 2021-12-08 PROCEDURE — 95910 NRV CNDJ TEST 7-8 STUDIES: CPT | Mod: S$GLB,,, | Performed by: PHYSICAL MEDICINE & REHABILITATION

## 2021-12-08 PROCEDURE — 95886 MUSC TEST DONE W/N TEST COMP: CPT | Mod: S$GLB,,, | Performed by: PHYSICAL MEDICINE & REHABILITATION

## 2021-12-10 ENCOUNTER — TELEPHONE (OUTPATIENT)
Dept: ORTHOPEDICS | Facility: CLINIC | Age: 41
End: 2021-12-10
Payer: COMMERCIAL

## 2021-12-15 ENCOUNTER — OFFICE VISIT (OUTPATIENT)
Dept: ORTHOPEDICS | Facility: CLINIC | Age: 41
End: 2021-12-15
Payer: COMMERCIAL

## 2021-12-15 VITALS — HEIGHT: 69 IN | BODY MASS INDEX: 32.58 KG/M2 | WEIGHT: 220 LBS

## 2021-12-15 DIAGNOSIS — G56.01 CARPAL TUNNEL SYNDROME ON RIGHT: Primary | ICD-10-CM

## 2021-12-15 PROCEDURE — 99203 OFFICE O/P NEW LOW 30 MIN: CPT | Mod: 25,S$GLB,, | Performed by: ORTHOPAEDIC SURGERY

## 2021-12-15 PROCEDURE — 99999 PR PBB SHADOW E&M-EST. PATIENT-LVL II: ICD-10-PCS | Mod: PBBFAC,,, | Performed by: ORTHOPAEDIC SURGERY

## 2021-12-15 PROCEDURE — 20526 PR INJECT CARPAL TUNNEL: ICD-10-PCS | Mod: RT,S$GLB,, | Performed by: ORTHOPAEDIC SURGERY

## 2021-12-15 PROCEDURE — 99203 PR OFFICE/OUTPT VISIT, NEW, LEVL III, 30-44 MIN: ICD-10-PCS | Mod: 25,S$GLB,, | Performed by: ORTHOPAEDIC SURGERY

## 2021-12-15 PROCEDURE — 99999 PR PBB SHADOW E&M-EST. PATIENT-LVL II: CPT | Mod: PBBFAC,,, | Performed by: ORTHOPAEDIC SURGERY

## 2021-12-15 PROCEDURE — 20526 THER INJECTION CARP TUNNEL: CPT | Mod: RT,S$GLB,, | Performed by: ORTHOPAEDIC SURGERY

## 2021-12-15 RX ORDER — TESTOSTERONE CYPIONATE 200 MG/ML
INJECTION, SOLUTION INTRAMUSCULAR
COMMUNITY
Start: 2021-11-30 | End: 2023-04-20

## 2021-12-15 RX ADMIN — TRIAMCINOLONE ACETONIDE 40 MG: 40 INJECTION, SUSPENSION INTRA-ARTICULAR; INTRAMUSCULAR at 09:12

## 2021-12-16 RX ORDER — TRIAMCINOLONE ACETONIDE 40 MG/ML
40 INJECTION, SUSPENSION INTRA-ARTICULAR; INTRAMUSCULAR
Status: DISCONTINUED | OUTPATIENT
Start: 2021-12-15 | End: 2021-12-16 | Stop reason: HOSPADM

## 2022-01-05 ENCOUNTER — OFFICE VISIT (OUTPATIENT)
Dept: CARDIOLOGY | Facility: CLINIC | Age: 42
End: 2022-01-05
Payer: COMMERCIAL

## 2022-01-05 VITALS
SYSTOLIC BLOOD PRESSURE: 128 MMHG | WEIGHT: 230.19 LBS | DIASTOLIC BLOOD PRESSURE: 87 MMHG | HEART RATE: 92 BPM | BODY MASS INDEX: 33.99 KG/M2

## 2022-01-05 DIAGNOSIS — E01.0 THYROMEGALY: ICD-10-CM

## 2022-01-05 DIAGNOSIS — I10 HYPERTENSION, UNSPECIFIED TYPE: Primary | ICD-10-CM

## 2022-01-05 DIAGNOSIS — I49.3 PVC'S (PREMATURE VENTRICULAR CONTRACTIONS): ICD-10-CM

## 2022-01-05 DIAGNOSIS — R00.2 PALPITATIONS: ICD-10-CM

## 2022-01-05 DIAGNOSIS — I34.1 MITRAL VALVE PROLAPSE: ICD-10-CM

## 2022-01-05 DIAGNOSIS — R00.2 PALPITATIONS: Primary | ICD-10-CM

## 2022-01-05 PROCEDURE — 93010 EKG 12-LEAD: ICD-10-PCS | Mod: S$GLB,,, | Performed by: INTERNAL MEDICINE

## 2022-01-05 PROCEDURE — 3008F PR BODY MASS INDEX (BMI) DOCUMENTED: ICD-10-PCS | Mod: CPTII,S$GLB,, | Performed by: INTERNAL MEDICINE

## 2022-01-05 PROCEDURE — 93005 ELECTROCARDIOGRAM TRACING: CPT | Mod: PO

## 2022-01-05 PROCEDURE — 99999 PR PBB SHADOW E&M-EST. PATIENT-LVL III: CPT | Mod: PBBFAC,,, | Performed by: INTERNAL MEDICINE

## 2022-01-05 PROCEDURE — 93010 ELECTROCARDIOGRAM REPORT: CPT | Mod: S$GLB,,, | Performed by: INTERNAL MEDICINE

## 2022-01-05 PROCEDURE — 99999 PR PBB SHADOW E&M-EST. PATIENT-LVL III: ICD-10-PCS | Mod: PBBFAC,,, | Performed by: INTERNAL MEDICINE

## 2022-01-05 PROCEDURE — 3074F SYST BP LT 130 MM HG: CPT | Mod: CPTII,S$GLB,, | Performed by: INTERNAL MEDICINE

## 2022-01-05 PROCEDURE — 3074F PR MOST RECENT SYSTOLIC BLOOD PRESSURE < 130 MM HG: ICD-10-PCS | Mod: CPTII,S$GLB,, | Performed by: INTERNAL MEDICINE

## 2022-01-05 PROCEDURE — 1159F PR MEDICATION LIST DOCUMENTED IN MEDICAL RECORD: ICD-10-PCS | Mod: CPTII,S$GLB,, | Performed by: INTERNAL MEDICINE

## 2022-01-05 PROCEDURE — 3079F PR MOST RECENT DIASTOLIC BLOOD PRESSURE 80-89 MM HG: ICD-10-PCS | Mod: CPTII,S$GLB,, | Performed by: INTERNAL MEDICINE

## 2022-01-05 PROCEDURE — 99204 OFFICE O/P NEW MOD 45 MIN: CPT | Mod: S$GLB,,, | Performed by: INTERNAL MEDICINE

## 2022-01-05 PROCEDURE — 99204 PR OFFICE/OUTPT VISIT, NEW, LEVL IV, 45-59 MIN: ICD-10-PCS | Mod: S$GLB,,, | Performed by: INTERNAL MEDICINE

## 2022-01-05 PROCEDURE — 3008F BODY MASS INDEX DOCD: CPT | Mod: CPTII,S$GLB,, | Performed by: INTERNAL MEDICINE

## 2022-01-05 PROCEDURE — 1160F RVW MEDS BY RX/DR IN RCRD: CPT | Mod: CPTII,S$GLB,, | Performed by: INTERNAL MEDICINE

## 2022-01-05 PROCEDURE — 1160F PR REVIEW ALL MEDS BY PRESCRIBER/CLIN PHARMACIST DOCUMENTED: ICD-10-PCS | Mod: CPTII,S$GLB,, | Performed by: INTERNAL MEDICINE

## 2022-01-05 PROCEDURE — 3079F DIAST BP 80-89 MM HG: CPT | Mod: CPTII,S$GLB,, | Performed by: INTERNAL MEDICINE

## 2022-01-05 PROCEDURE — 1159F MED LIST DOCD IN RCRD: CPT | Mod: CPTII,S$GLB,, | Performed by: INTERNAL MEDICINE

## 2022-01-05 RX ORDER — THYROID, PORCINE 30 MG/1
30 TABLET ORAL DAILY
COMMUNITY
Start: 2021-12-28 | End: 2023-04-20

## 2022-01-05 NOTE — PROGRESS NOTES
Subjective:    Patient ID:  Yokasta Pratt is a 41 y.o. female patient here for evaluation Palpitations      History of Present Illness:  CARDIOLOGY INITIAL EVALUATION.  HISTORY OF PVCS.  History dates back to approximately 2017, peripartum.  Cardiac evaluation  with Holter monitor and echo.  Approximately 100 PVCs per hour noted on Holter monitor, echo with no structural heart issues, EF normal.  No valvular issues noted that time.    Palpitations of recent increased last to 3 weeks.  Mostly noted at night at rest.  No definite exertional symptomatology.  History of syncope.  No PND orthopnea.  No prior history of DVT PE.    Quit tobacco 7 years ago.  No hypertension dyslipidemia diabetes mellitus.             Review of patient's allergies indicates:   Allergen Reactions    Codeine Other (See Comments)     Makes patient hyper and talks gibberish       Past Medical History:   Diagnosis Date    Allergy     Anxiety     Endometriosis     Headache(784.0)     Infertility, female     PCOS (polycystic ovarian syndrome)     Sinusitis     Strep throat      Past Surgical History:   Procedure Laterality Date     SECTION Bilateral     cholecystectomy  ??    CHOLECYSTECTOMY      DILATION AND CURETTAGE OF UTERUS      laproscope      TONSILLECTOMY      WISDOM TOOTH EXTRACTION       Social History     Tobacco Use    Smoking status: Former Smoker     Packs/day: 0.25     Types: Cigarettes     Quit date: 2014     Years since quittin.5    Smokeless tobacco: Never Used   Substance Use Topics    Alcohol use: No     Comment: 2-3 days/week    Drug use: No        Review of Systems:    As noted in HPI in addition         REVIEW OF SYSTEMS  Review of Systems   Constitutional: Negative for decreased appetite, diaphoresis, night sweats, weight gain and weight loss.   HENT: Negative for nosebleeds and odynophagia.    Eyes: Negative for double vision and photophobia.   Cardiovascular:  Positive for irregular heartbeat and palpitations. Negative for chest pain, claudication, cyanosis, dyspnea on exertion, leg swelling, near-syncope, orthopnea, paroxysmal nocturnal dyspnea and syncope.        HX MVP?   Respiratory: Negative for cough, hemoptysis, shortness of breath and wheezing.    Hematologic/Lymphatic: Negative for adenopathy.   Skin: Negative for flushing, skin cancer and suspicious lesions.   Musculoskeletal: Negative for gout, myalgias and neck pain.   Gastrointestinal: Negative for abdominal pain, heartburn, hematemesis and hematochezia.   Genitourinary: Negative for bladder incontinence, hesitancy and nocturia.   Neurological: Negative for focal weakness, headaches, light-headedness and paresthesias.   Psychiatric/Behavioral: Negative for memory loss and substance abuse.       Objective:        Vitals:    01/05/22 1003   BP: 128/87   Pulse: 92       Lab Results   Component Value Date    WBC 8.51 02/27/2019    HGB 14.6 02/27/2019    HCT 44.6 02/27/2019     02/27/2019    CHOL 189 02/27/2019    TRIG 59 02/27/2019    HDL 53 02/27/2019    ALT 27 02/27/2019    AST 21 02/27/2019     02/27/2019    K 4.2 02/27/2019     02/27/2019    CREATININE 0.7 02/27/2019    BUN 15 02/27/2019    CO2 28 02/27/2019    TSH 1.348 02/27/2019    HGBA1C 5.2 02/27/2019      CARDIOGRAM RESULTS  Results for orders placed in visit on 11/13/18    Transthoracic echo (TTE) complete    Interpretation Summary  · Normal left ventricular systolic function. The estimated ejection fraction is 55%  · Normal LV diastolic function.  · Normal right ventricular systolic function.  · Normal valve structures  · Trace tricuspid regurgitation.  · The estimated PA systolic pressure is 26.43 mm Hg        CURRENT/PREVIOUS VISIT EKG    No valid procedures specified.   No results found for this or any previous visit.    No valid procedures specified.    PHYSICAL EXAM  CONSTITUTIONAL: Well built, well nourished in no apparent  distress  NECK: no carotid bruit, no JVD  LUNGS: CTA  CHEST WALL: no tenderness,  HEART: regular rate and rhythm, S1, S2 normal, no murmur, click, rub or gallop   ABDOMEN: soft, non-tender; bowel sounds normal; no masses,  no organomegaly  EXTREMITIES: Extremities normal, no edema, no calf tenderness noted  VASCULAR EXAM: 2 PLUS UPPER AND LOWER EXT PULSES  NEURO: AAO X 3, NO ACUTE FOCAL OR LATERALIZING FINDINGS    I HAVE REVIEWED :    The vital signs, nurses notes, and all the pertinent radiology and labs.         Current Outpatient Medications   Medication Instructions    ARMOUR THYROID 30 mg, Oral, Daily    cetirizine (ZYRTEC) 10 mg, Oral, Daily    DULoxetine (CYMBALTA) 50 mg, Oral, Daily    testosterone cypionate (DEPOTESTOTERONE CYPIONATE) 200 mg/mL injection SMARTSIG:Milliliter(s) IM          Assessment:   History of PVCs.  Last Holter monitor echo performed in 20/18, medical therapy recommended.  Risk factor modification.    Recent increase in ectopy.  Mostly resting symptomatology.    History of thyroid disease.        Plan:   48 hour Holter monitor.  Echo.  Return to clinic results.          No follow-ups on file.

## 2022-01-26 ENCOUNTER — CLINICAL SUPPORT (OUTPATIENT)
Dept: CARDIOLOGY | Facility: HOSPITAL | Age: 42
End: 2022-01-26
Attending: INTERNAL MEDICINE
Payer: COMMERCIAL

## 2022-01-26 VITALS — WEIGHT: 230 LBS | BODY MASS INDEX: 34.07 KG/M2 | HEART RATE: 107 BPM | HEIGHT: 69 IN

## 2022-01-26 DIAGNOSIS — I34.1 MITRAL VALVE PROLAPSE: ICD-10-CM

## 2022-01-26 DIAGNOSIS — I49.3 PVC'S (PREMATURE VENTRICULAR CONTRACTIONS): ICD-10-CM

## 2022-01-26 DIAGNOSIS — R00.2 PALPITATIONS: ICD-10-CM

## 2022-01-26 DIAGNOSIS — I10 HYPERTENSION, UNSPECIFIED TYPE: ICD-10-CM

## 2022-01-26 PROCEDURE — 93227 XTRNL ECG REC<48 HR R&I: CPT | Mod: ,,, | Performed by: INTERNAL MEDICINE

## 2022-01-26 PROCEDURE — 93306 ECHO (CUPID ONLY): ICD-10-PCS | Mod: 26,,, | Performed by: INTERNAL MEDICINE

## 2022-01-26 PROCEDURE — 93306 TTE W/DOPPLER COMPLETE: CPT | Mod: PO

## 2022-01-26 PROCEDURE — 93226 XTRNL ECG REC<48 HR SCAN A/R: CPT | Mod: PO

## 2022-01-26 PROCEDURE — 93227 HOLTER MONITOR - 48 HOUR (CUPID ONLY): ICD-10-PCS | Mod: ,,, | Performed by: INTERNAL MEDICINE

## 2022-01-26 PROCEDURE — 93306 TTE W/DOPPLER COMPLETE: CPT | Mod: 26,,, | Performed by: INTERNAL MEDICINE

## 2022-01-27 LAB
ASCENDING AORTA: 2.05 CM
AV INDEX (PROSTH): 0.59
AV MEAN GRADIENT: 4 MMHG
AV PEAK GRADIENT: 7 MMHG
AV VALVE AREA: 1.78 CM2
AV VELOCITY RATIO: 0.76
BSA FOR ECHO PROCEDURE: 2.25 M2
CV ECHO LV RWT: 0.71 CM
DOP CALC AO PEAK VEL: 1.31 M/S
DOP CALC AO VTI: 27.54 CM
DOP CALC LVOT AREA: 3 CM2
DOP CALC LVOT DIAMETER: 1.96 CM
DOP CALC LVOT PEAK VEL: 1 M/S
DOP CALC LVOT STROKE VOLUME: 49.03 CM3
DOP CALCLVOT PEAK VEL VTI: 16.26 CM
E WAVE DECELERATION TIME: 146.37 MSEC
E/A RATIO: 0.84
E/E' RATIO: 4.69 M/S
ECHO LV POSTERIOR WALL: 1.25 CM (ref 0.6–1.1)
EJECTION FRACTION: 55 %
FRACTIONAL SHORTENING: 37 % (ref 28–44)
INTERVENTRICULAR SEPTUM: 1.38 CM (ref 0.6–1.1)
IVRT: 85.63 MSEC
LA MAJOR: 4.38 CM
LA MINOR: 4.68 CM
LA WIDTH: 2.62 CM
LEFT ATRIUM SIZE: 2.81 CM
LEFT ATRIUM VOLUME INDEX: 12.9 ML/M2
LEFT ATRIUM VOLUME: 28.32 CM3
LEFT INTERNAL DIMENSION IN SYSTOLE: 2.22 CM (ref 2.1–4)
LEFT VENTRICLE DIASTOLIC VOLUME INDEX: 23.25 ML/M2
LEFT VENTRICLE DIASTOLIC VOLUME: 50.91 ML
LEFT VENTRICLE MASS INDEX: 71 G/M2
LEFT VENTRICLE SYSTOLIC VOLUME INDEX: 7.6 ML/M2
LEFT VENTRICLE SYSTOLIC VOLUME: 16.6 ML
LEFT VENTRICULAR INTERNAL DIMENSION IN DIASTOLE: 3.5 CM (ref 3.5–6)
LEFT VENTRICULAR MASS: 156.58 G
LV LATERAL E/E' RATIO: 4.36 M/S
LV SEPTAL E/E' RATIO: 5.08 M/S
MV A" WAVE DURATION": 12.27 MSEC
MV PEAK A VEL: 0.73 M/S
MV PEAK E VEL: 0.61 M/S
MV STENOSIS PRESSURE HALF TIME: 42.45 MS
MV VALVE AREA P 1/2 METHOD: 5.18 CM2
PISA TR MAX VEL: 1.39 M/S
PULM VEIN S/D RATIO: 1.84
PV PEAK D VEL: 0.37 M/S
PV PEAK S VEL: 0.68 M/S
RA MAJOR: 4.07 CM
RA PRESSURE: 3 MMHG
RA WIDTH: 3.59 CM
RIGHT VENTRICULAR END-DIASTOLIC DIMENSION: 2.78 CM
RV TISSUE DOPPLER FREE WALL SYSTOLIC VELOCITY 1 (APICAL 4 CHAMBER VIEW): 10.33 CM/S
SINUS: 2.69 CM
STJ: 2.34 CM
TDI LATERAL: 0.14 M/S
TDI SEPTAL: 0.12 M/S
TDI: 0.13 M/S
TR MAX PG: 8 MMHG
TRICUSPID ANNULAR PLANE SYSTOLIC EXCURSION: 1.57 CM
TV REST PULMONARY ARTERY PRESSURE: 11 MMHG

## 2022-02-02 LAB
OHS CV EVENT MONITOR DAY: 0
OHS CV HOLTER LENGTH DECIMAL HOURS: 48
OHS CV HOLTER LENGTH HOURS: 48
OHS CV HOLTER LENGTH MINUTES: 0
OHS CV HOLTER SINUS AVERAGE HR: 88
OHS CV HOLTER SINUS MAX HR: 135
OHS CV HOLTER SINUS MIN HR: 54

## 2022-02-03 ENCOUNTER — OFFICE VISIT (OUTPATIENT)
Dept: CARDIOLOGY | Facility: CLINIC | Age: 42
End: 2022-02-03
Payer: COMMERCIAL

## 2022-02-03 VITALS
DIASTOLIC BLOOD PRESSURE: 74 MMHG | SYSTOLIC BLOOD PRESSURE: 122 MMHG | HEIGHT: 69 IN | WEIGHT: 231.94 LBS | BODY MASS INDEX: 34.35 KG/M2 | HEART RATE: 88 BPM

## 2022-02-03 DIAGNOSIS — I34.1 MITRAL VALVE PROLAPSE: ICD-10-CM

## 2022-02-03 DIAGNOSIS — I49.3 PVC'S (PREMATURE VENTRICULAR CONTRACTIONS): ICD-10-CM

## 2022-02-03 DIAGNOSIS — O09.522 ELDERLY MULTIGRAVIDA IN SECOND TRIMESTER: ICD-10-CM

## 2022-02-03 DIAGNOSIS — R00.2 PALPITATIONS: ICD-10-CM

## 2022-02-03 DIAGNOSIS — I10 HYPERTENSION, UNSPECIFIED TYPE: Primary | ICD-10-CM

## 2022-02-03 DIAGNOSIS — I49.3 PVC'S (PREMATURE VENTRICULAR CONTRACTIONS): Primary | ICD-10-CM

## 2022-02-03 PROCEDURE — 1159F MED LIST DOCD IN RCRD: CPT | Mod: CPTII,S$GLB,, | Performed by: INTERNAL MEDICINE

## 2022-02-03 PROCEDURE — 3078F DIAST BP <80 MM HG: CPT | Mod: CPTII,S$GLB,, | Performed by: INTERNAL MEDICINE

## 2022-02-03 PROCEDURE — 3074F SYST BP LT 130 MM HG: CPT | Mod: CPTII,S$GLB,, | Performed by: INTERNAL MEDICINE

## 2022-02-03 PROCEDURE — 99999 PR PBB SHADOW E&M-EST. PATIENT-LVL III: ICD-10-PCS | Mod: PBBFAC,,, | Performed by: INTERNAL MEDICINE

## 2022-02-03 PROCEDURE — 99213 OFFICE O/P EST LOW 20 MIN: CPT | Mod: S$GLB,,, | Performed by: INTERNAL MEDICINE

## 2022-02-03 PROCEDURE — 1160F RVW MEDS BY RX/DR IN RCRD: CPT | Mod: CPTII,S$GLB,, | Performed by: INTERNAL MEDICINE

## 2022-02-03 PROCEDURE — 99999 PR PBB SHADOW E&M-EST. PATIENT-LVL III: CPT | Mod: PBBFAC,,, | Performed by: INTERNAL MEDICINE

## 2022-02-03 PROCEDURE — 1159F PR MEDICATION LIST DOCUMENTED IN MEDICAL RECORD: ICD-10-PCS | Mod: CPTII,S$GLB,, | Performed by: INTERNAL MEDICINE

## 2022-02-03 PROCEDURE — 3078F PR MOST RECENT DIASTOLIC BLOOD PRESSURE < 80 MM HG: ICD-10-PCS | Mod: CPTII,S$GLB,, | Performed by: INTERNAL MEDICINE

## 2022-02-03 PROCEDURE — 3008F PR BODY MASS INDEX (BMI) DOCUMENTED: ICD-10-PCS | Mod: CPTII,S$GLB,, | Performed by: INTERNAL MEDICINE

## 2022-02-03 PROCEDURE — 3074F PR MOST RECENT SYSTOLIC BLOOD PRESSURE < 130 MM HG: ICD-10-PCS | Mod: CPTII,S$GLB,, | Performed by: INTERNAL MEDICINE

## 2022-02-03 PROCEDURE — 1160F PR REVIEW ALL MEDS BY PRESCRIBER/CLIN PHARMACIST DOCUMENTED: ICD-10-PCS | Mod: CPTII,S$GLB,, | Performed by: INTERNAL MEDICINE

## 2022-02-03 PROCEDURE — 3008F BODY MASS INDEX DOCD: CPT | Mod: CPTII,S$GLB,, | Performed by: INTERNAL MEDICINE

## 2022-02-03 PROCEDURE — 99213 PR OFFICE/OUTPT VISIT, EST, LEVL III, 20-29 MIN: ICD-10-PCS | Mod: S$GLB,,, | Performed by: INTERNAL MEDICINE

## 2022-02-03 NOTE — PROGRESS NOTES
Subjective:    Patient ID:  Yokasta Pratt is a 41 y.o. female patient here for evaluation Hypertension, Palpitations, and Results      History of Present Illness:  Follow-up visit.  PVCs.  Holter monitor with increased number of PVCs since last Holter in .  Patient is symptomatic mostly at rest.  Echo with preserved ejection fraction, some increase in interventricular septal and posterior wall thickness, blood pressure well controlled.  No structural heart issues demonstrated.     Complaint occasional dizziness.  No syncope or presyncope.  No angina chest pain.        Review of patient's allergies indicates:   Allergen Reactions    Codeine Other (See Comments)     Makes patient hyper and talks gibberish       Past Medical History:   Diagnosis Date    Allergy     Anxiety     Endometriosis     Headache(784.0)     Infertility, female     PCOS (polycystic ovarian syndrome)     Sinusitis     Strep throat      Past Surgical History:   Procedure Laterality Date     SECTION Bilateral     cholecystectomy  ??    CHOLECYSTECTOMY      DILATION AND CURETTAGE OF UTERUS      laproscope      TONSILLECTOMY      WISDOM TOOTH EXTRACTION       Social History     Tobacco Use    Smoking status: Former Smoker     Packs/day: 0.25     Types: Cigarettes     Quit date: 2014     Years since quittin.6    Smokeless tobacco: Never Used   Substance Use Topics    Alcohol use: No     Comment: 2-3 days/week    Drug use: No        Review of Systems:    As noted in HPI in addition      REVIEW OF SYSTEMS  Review of Systems   Constitutional: Negative for decreased appetite, diaphoresis, night sweats, weight gain and weight loss.   HENT: Negative for nosebleeds and odynophagia.    Eyes: Negative for double vision and photophobia.   Cardiovascular: Positive for palpitations. Negative for chest pain, claudication, cyanosis, dyspnea on exertion, irregular heartbeat, leg swelling, near-syncope,  orthopnea, paroxysmal nocturnal dyspnea and syncope.   Respiratory: Negative for cough, hemoptysis, shortness of breath and wheezing.    Hematologic/Lymphatic: Negative for adenopathy.   Skin: Negative for flushing, skin cancer and suspicious lesions.   Musculoskeletal: Negative for gout, myalgias and neck pain.   Gastrointestinal: Negative for abdominal pain, heartburn, hematemesis and hematochezia.   Genitourinary: Negative for bladder incontinence, hesitancy and nocturia.   Neurological: Positive for light-headedness. Negative for focal weakness, headaches and paresthesias.   Psychiatric/Behavioral: Negative for memory loss and substance abuse.              Objective:        Vitals:    02/03/22 1131   BP: 122/74   Pulse: 88       Lab Results   Component Value Date    WBC 8.51 02/27/2019    HGB 14.6 02/27/2019    HCT 44.6 02/27/2019     02/27/2019    CHOL 189 02/27/2019    TRIG 59 02/27/2019    HDL 53 02/27/2019    ALT 27 02/27/2019    AST 21 02/27/2019     02/27/2019    K 4.2 02/27/2019     02/27/2019    CREATININE 0.7 02/27/2019    BUN 15 02/27/2019    CO2 28 02/27/2019    TSH 1.348 02/27/2019    HGBA1C 5.2 02/27/2019        ECHOCARDIOGRAM RESULTS  Results for orders placed in visit on 01/26/22    Echo    Interpretation Summary  · Concentric remodeling and normal systolic function.  · The estimated ejection fraction is 55%.  · Normal left ventricular diastolic function.  · Normal right ventricular size with normal right ventricular systolic function.  · Mild to moderate tricuspid regurgitation.  · Normal central venous pressure (3 mmHg).  · The estimated PA systolic pressure is 11 mmHg.        CURRENT/PREVIOUS VISIT EKG  Results for orders placed or performed in visit on 01/05/22   IN OFFICE EKG 12-LEAD (to [place])    Collection Time: 01/05/22 11:05 AM    Narrative    Test Reason : R00.2,I34.1,    Vent. Rate : 096 BPM     Atrial Rate : 096 BPM     P-R Int : 168 ms          QRS Dur : 070 ms       QT Int : 336 ms       P-R-T Axes : 043 033 050 degrees     QTc Int : 424 ms    Normal sinus rhythm  Normal ECG  When compared with ECG of 31-OCT-2018 10:57,  No significant change was found  Confirmed by WANDA HART MD ([id number]) on 1/6/2022 8:06:17 AM    Referred By: [facility]            Confirmed By:WANDA HART MD     No valid procedures specified.   No results found for this or any previous visit.    No valid procedures specified.    PHYSICAL EXAM  CONSTITUTIONAL: Well built, well nourished in no apparent distress  NECK: no carotid bruit, no JVD  LUNGS: CTA  CHEST WALL: no tenderness,  HEART: regular rate and rhythm, S1, S2 normal, no murmur, click, rub or gallop   ABDOMEN: soft, non-tender; bowel sounds normal; no masses,  no organomegaly  EXTREMITIES: Extremities normal, no edema, no calf tenderness noted  NEURO: AAO X 3    I HAVE REVIEWED :    The vital signs, nurses notes, and all the pertinent radiology and labs.         Current Outpatient Medications   Medication Instructions    ARMOUR THYROID 30 mg, Oral, Daily    cetirizine (ZYRTEC) 10 mg, Oral, Daily    DULoxetine (CYMBALTA) 50 mg, Oral, Daily    testosterone cypionate (DEPOTESTOTERONE CYPIONATE) 200 mg/mL injection SMARTSIG:Milliliter(s) IM          Assessment:   Chronic ventricular ectopy, interval increasing burden since Holter monitor 20/19.  Preserved ejection fraction with mild asymmetric hypertrophy.  Pressure well controlled.        Plan:   Suggest EP evaluation.  Continue risk factor modification.  Return to clinic 4 months.          No follow-ups on file.

## 2022-04-23 NOTE — PROGRESS NOTES
Subjective:    Patient ID:  Yokasta Pratt is a 41 y.o. female who presents for evaluation of PVC's      HPI 42 yo female with PVC's, Htn, anxiety.  Primary cardiologist is Dr. Somers.  She owns 2 retail stores.    Initially found to have PVC's during pregnancy. No medications initiated.  More recently has noted increasing palpitations, starting at Xmas (period of high stress at work).  Worse at rest and worse at night.    Had a vagal response with pregnant, no syncope since.    Has been treated for anxiety >> did not help with the PVC's.    48 hr holter 1/26/22 19932 PVC's (8.0% burden)  Echo 1/26/22 normal biventricular structure and function mild MR.    ECG reveals nsr with occasional monomorphic PVC's, LBBB with V3 transition, inferior axis.    Review of Systems   Constitutional: Negative. Negative for fever and malaise/fatigue.   HENT: Negative for congestion and sore throat.    Cardiovascular: Positive for palpitations. Negative for chest pain, dyspnea on exertion, irregular heartbeat, leg swelling, near-syncope, orthopnea, paroxysmal nocturnal dyspnea and syncope.   Respiratory: Negative for cough and shortness of breath.    Gastrointestinal: Negative for abdominal pain, constipation and diarrhea.   Neurological: Negative for dizziness, light-headedness and weakness.   Psychiatric/Behavioral: Negative for depression. The patient is not nervous/anxious.         Objective:    Physical Exam  Constitutional:       Appearance: She is well-developed.   Eyes:      General: No scleral icterus.     Conjunctiva/sclera: Conjunctivae normal.   Neck:      Vascular: No JVD.      Trachea: No tracheal deviation.   Cardiovascular:      Rate and Rhythm: Normal rate and regular rhythm. Occasional extrasystoles are present.     Chest Wall: PMI is not displaced.   Pulmonary:      Effort: Pulmonary effort is normal. No respiratory distress.      Breath sounds: Normal breath sounds.   Abdominal:      Palpations: Abdomen  is soft.      Tenderness: There is no abdominal tenderness.   Musculoskeletal:         General: No tenderness.   Skin:     General: Skin is warm and dry.      Findings: No rash.   Neurological:      Mental Status: She is alert and oriented to person, place, and time.   Psychiatric:         Behavior: Behavior normal.           Assessment:       1. PVC's (premature ventricular contractions)    2. Hypertension, unspecified type    3. Palpitations    4. Anxiety         Plan:           Frequent monomorphic PVC's, symptomatic. [place] is not enough that raise concern for cardiomyopathy.  Discussed options at length, includin) Observe off medications  2) Beta blocker therapy  3) RFA  Would not endorse RFA without trial of beta blocker.  Her preference is to defer therapy.  F/u in one year.

## 2022-04-25 ENCOUNTER — OFFICE VISIT (OUTPATIENT)
Dept: CARDIOLOGY | Facility: CLINIC | Age: 42
End: 2022-04-25
Payer: COMMERCIAL

## 2022-04-25 VITALS
HEIGHT: 69 IN | BODY MASS INDEX: 34.25 KG/M2 | SYSTOLIC BLOOD PRESSURE: 138 MMHG | WEIGHT: 231.25 LBS | HEART RATE: 112 BPM | DIASTOLIC BLOOD PRESSURE: 87 MMHG

## 2022-04-25 DIAGNOSIS — F41.9 ANXIETY: ICD-10-CM

## 2022-04-25 DIAGNOSIS — I49.8 OTHER SPECIFIED CARDIAC ARRHYTHMIAS: Primary | ICD-10-CM

## 2022-04-25 DIAGNOSIS — I49.3 PVC'S (PREMATURE VENTRICULAR CONTRACTIONS): Primary | ICD-10-CM

## 2022-04-25 DIAGNOSIS — R00.2 PALPITATIONS: ICD-10-CM

## 2022-04-25 DIAGNOSIS — I49.8 OTHER SPECIFIED CARDIAC ARRHYTHMIAS: ICD-10-CM

## 2022-04-25 DIAGNOSIS — I10 HYPERTENSION, UNSPECIFIED TYPE: ICD-10-CM

## 2022-04-25 PROCEDURE — 99204 OFFICE O/P NEW MOD 45 MIN: CPT | Mod: S$GLB,,, | Performed by: INTERNAL MEDICINE

## 2022-04-25 PROCEDURE — 3008F PR BODY MASS INDEX (BMI) DOCUMENTED: ICD-10-PCS | Mod: CPTII,S$GLB,, | Performed by: INTERNAL MEDICINE

## 2022-04-25 PROCEDURE — 99999 PR PBB SHADOW E&M-EST. PATIENT-LVL III: CPT | Mod: PBBFAC,,, | Performed by: INTERNAL MEDICINE

## 2022-04-25 PROCEDURE — 3079F DIAST BP 80-89 MM HG: CPT | Mod: CPTII,S$GLB,, | Performed by: INTERNAL MEDICINE

## 2022-04-25 PROCEDURE — 93010 ELECTROCARDIOGRAM REPORT: CPT | Mod: S$GLB,,, | Performed by: INTERNAL MEDICINE

## 2022-04-25 PROCEDURE — 3075F PR MOST RECENT SYSTOLIC BLOOD PRESS GE 130-139MM HG: ICD-10-PCS | Mod: CPTII,S$GLB,, | Performed by: INTERNAL MEDICINE

## 2022-04-25 PROCEDURE — 3075F SYST BP GE 130 - 139MM HG: CPT | Mod: CPTII,S$GLB,, | Performed by: INTERNAL MEDICINE

## 2022-04-25 PROCEDURE — 93005 ELECTROCARDIOGRAM TRACING: CPT | Mod: PO

## 2022-04-25 PROCEDURE — 3079F PR MOST RECENT DIASTOLIC BLOOD PRESSURE 80-89 MM HG: ICD-10-PCS | Mod: CPTII,S$GLB,, | Performed by: INTERNAL MEDICINE

## 2022-04-25 PROCEDURE — 93010 RHYTHM STRIP: ICD-10-PCS | Mod: S$GLB,,, | Performed by: INTERNAL MEDICINE

## 2022-04-25 PROCEDURE — 99999 PR PBB SHADOW E&M-EST. PATIENT-LVL III: ICD-10-PCS | Mod: PBBFAC,,, | Performed by: INTERNAL MEDICINE

## 2022-04-25 PROCEDURE — 99204 PR OFFICE/OUTPT VISIT, NEW, LEVL IV, 45-59 MIN: ICD-10-PCS | Mod: S$GLB,,, | Performed by: INTERNAL MEDICINE

## 2022-04-25 PROCEDURE — 3008F BODY MASS INDEX DOCD: CPT | Mod: CPTII,S$GLB,, | Performed by: INTERNAL MEDICINE

## 2022-05-02 ENCOUNTER — PATIENT MESSAGE (OUTPATIENT)
Dept: ORTHOPEDICS | Facility: CLINIC | Age: 42
End: 2022-05-02
Payer: COMMERCIAL

## 2022-05-25 ENCOUNTER — OFFICE VISIT (OUTPATIENT)
Dept: ORTHOPEDICS | Facility: CLINIC | Age: 42
End: 2022-05-25
Payer: COMMERCIAL

## 2022-05-25 DIAGNOSIS — G56.01 CARPAL TUNNEL SYNDROME ON RIGHT: Primary | ICD-10-CM

## 2022-05-25 PROCEDURE — 99999 PR PBB SHADOW E&M-EST. PATIENT-LVL III: ICD-10-PCS | Mod: PBBFAC,,, | Performed by: ORTHOPAEDIC SURGERY

## 2022-05-25 PROCEDURE — 99999 PR PBB SHADOW E&M-EST. PATIENT-LVL III: CPT | Mod: PBBFAC,,, | Performed by: ORTHOPAEDIC SURGERY

## 2022-05-25 PROCEDURE — 99214 PR OFFICE/OUTPT VISIT, EST, LEVL IV, 30-39 MIN: ICD-10-PCS | Mod: S$GLB,,, | Performed by: ORTHOPAEDIC SURGERY

## 2022-05-25 PROCEDURE — 1159F MED LIST DOCD IN RCRD: CPT | Mod: CPTII,S$GLB,, | Performed by: ORTHOPAEDIC SURGERY

## 2022-05-25 PROCEDURE — 99214 OFFICE O/P EST MOD 30 MIN: CPT | Mod: S$GLB,,, | Performed by: ORTHOPAEDIC SURGERY

## 2022-05-25 PROCEDURE — 1159F PR MEDICATION LIST DOCUMENTED IN MEDICAL RECORD: ICD-10-PCS | Mod: CPTII,S$GLB,, | Performed by: ORTHOPAEDIC SURGERY

## 2022-05-25 NOTE — PATIENT INSTRUCTIONS
Surgery Instructions:     Your surgery is scheduled on 7/19 at [facility] [address] [facility], second entrance.    The pre-op department will be in contact with you prior to your procedure to review medications and instructions.       Nothing to eat or drink after midnight prior to day of surgery.    You should STOP taking any blood thinners 5 days prior to surgery.     Please obtain medical and cardiac clearance as advised prior to surgery. All preoperative testing should be done as soon as possible as it may be needed to obtain clearance.    The surgery center will contact you the day prior to surgery to advise you of your arrival time for surgery.     Your post op appointment is scheduled on 8/1/2022 at 8:00.    You will be contacted by an automated text message every morning for for 14 days after your surgery inquiring if you have any COVID symptoms.  If you have any concerns regarding COVID please reply to the text message and then an [facility] Registered [facility] will contact you later that day.

## 2022-05-30 NOTE — PROGRESS NOTES
2022    Chief Complaint:  Chief Complaint   Patient presents with    Right Wrist - Pain, Numbness       HPI:  Yokasta Pratt is a 41 y.o. female, who presents to clinic today she has a history of right carpal tunnel syndrome.  She states that she continues to have return of pain numbness.  She has tried splinting and steroid injection the past.  She is here today to discuss further treatment options.    PMHX:  Past Medical History:   Diagnosis Date    Allergy     Anxiety     Endometriosis     Headache(784.0)     Infertility, female     PCOS (polycystic ovarian syndrome)     Sinusitis     Strep throat        PSHX:  Past Surgical History:   Procedure Laterality Date     SECTION Bilateral     cholecystectomy  ??    CHOLECYSTECTOMY      DILATION AND CURETTAGE OF UTERUS      laproscope      TONSILLECTOMY      WISDOM TOOTH EXTRACTION         FMHX:  Family History   Problem Relation Age of Onset    Cancer Maternal Grandmother     Breast cancer Maternal Grandmother     Heart disease Maternal Grandfather     Cancer Paternal Grandmother     Hypertension Father        SOCHX:  Social History     Tobacco Use    Smoking status: Former Smoker     Packs/day: 0.25     Types: Cigarettes     Quit date: 2014     Years since quittin.9    Smokeless tobacco: Never Used   Substance Use Topics    Alcohol use: No     Comment: 2-3 days/week       ALLERGIES:  Codeine    CURRENT MEDICATIONS:  Current Outpatient Medications on File Prior to Visit   Medication Sig Dispense Refill    ARMOUR THYROID 30 mg Tab Take 30 mg by mouth once daily.      cetirizine (ZYRTEC) 10 MG tablet Take 10 mg by mouth once daily.      DULoxetine (CYMBALTA) 60 MG capsule Take 50 mg by mouth once daily.      ibuprofen (ADVIL ORAL) Take by mouth.      testosterone cypionate (DEPOTESTOTERONE CYPIONATE) 200 mg/mL injection SMARTSIG:Milliliter(s) IM       No current facility-administered medications on file  prior to visit.       REVIEW OF SYSTEMS:  ROS    GENERAL PHYSICAL EXAM:   There were no vitals taken for this visit.   GEN: well developed, well nourished, no acute distress   HENT: Normocephalic, atraumatic   EYES: No discharge, conjunctiva normal   NECK: Supple, non-tender   PULM: No wheezing, no respiratory distress   CV: RRR   ABD: Soft, non-tender    ORTHO EXAM:   Examination the right hand and wrist reveals that there is no edema.  There are no skin changes.  Palpation produces no tenderness.  She does report decreased sensation in the median distribution with intact ulnar and radial sensation.  She has a positive Tinel's and a positive Durkan's test.    EMG/nerve conduction study:   Nerve conduction study has been reviewed.  It is consistent with moderate right carpal tunnel syndrome and mild left carpal tunnel syndrome    ASSESSMENT:   Right carpal tunnel syndrome    PLAN:  1. I have discussed treatment options with the patient.  I did discuss the possibility of carpal tunnel release.  After discussion the risks and benefits of the procedure informed consent has been obtained    2. Will proceed with right carpal tunnel release    3. Patient will follow up with me 2 weeks postoperatively

## 2022-07-10 ENCOUNTER — PATIENT MESSAGE (OUTPATIENT)
Dept: ORTHOPEDICS | Facility: CLINIC | Age: 42
End: 2022-07-10
Payer: COMMERCIAL

## 2022-07-11 DIAGNOSIS — G56.01 CARPAL TUNNEL SYNDROME ON RIGHT: Primary | ICD-10-CM

## 2022-07-14 ENCOUNTER — TELEPHONE (OUTPATIENT)
Dept: ORTHOPEDICS | Facility: CLINIC | Age: 42
End: 2022-07-14
Payer: COMMERCIAL

## 2022-07-14 DIAGNOSIS — F41.1 PRE-OPERATIVE ANXIETY: Primary | ICD-10-CM

## 2022-07-14 RX ORDER — DIAZEPAM 5 MG/1
5 TABLET ORAL ONCE
Qty: 1 TABLET | Refills: 0 | Status: SHIPPED | OUTPATIENT
Start: 2022-07-14 | End: 2023-04-20

## 2022-07-14 RX ORDER — SEMAGLUTIDE 1.34 MG/ML
0.5 INJECTION, SOLUTION SUBCUTANEOUS
COMMUNITY
End: 2023-04-27

## 2022-07-19 ENCOUNTER — HOSPITAL ENCOUNTER (OUTPATIENT)
Facility: HOSPITAL | Age: 42
Discharge: HOME | End: 2022-07-19
Attending: ORTHOPAEDIC SURGERY | Admitting: ORTHOPAEDIC SURGERY
Payer: COMMERCIAL

## 2022-07-19 VITALS
OXYGEN SATURATION: 99 % | BODY MASS INDEX: 32.58 KG/M2 | TEMPERATURE: 98 F | DIASTOLIC BLOOD PRESSURE: 92 MMHG | HEART RATE: 104 BPM | WEIGHT: 220 LBS | RESPIRATION RATE: 16 BRPM | HEIGHT: 69 IN | SYSTOLIC BLOOD PRESSURE: 143 MMHG

## 2022-07-19 DIAGNOSIS — G56.01 CARPAL TUNNEL SYNDROME ON RIGHT: ICD-10-CM

## 2022-07-19 LAB
B-HCG UR QL: NEGATIVE
CTP QC/QA: YES

## 2022-07-19 PROCEDURE — 81025 URINE PREGNANCY TEST: CPT | Mod: PO | Performed by: ORTHOPAEDIC SURGERY

## 2022-07-19 PROCEDURE — 25000003 PHARM REV CODE 250: Mod: PO | Performed by: ORTHOPAEDIC SURGERY

## 2022-07-19 PROCEDURE — 36000706: Mod: PO | Performed by: ORTHOPAEDIC SURGERY

## 2022-07-19 PROCEDURE — 63600175 PHARM REV CODE 636 W HCPCS: Mod: PO | Performed by: PHYSICIAN ASSISTANT

## 2022-07-19 PROCEDURE — 64721 PR REVISE MEDIAN N/CARPAL TUNNEL SURG: ICD-10-PCS | Mod: RT,,, | Performed by: ORTHOPAEDIC SURGERY

## 2022-07-19 PROCEDURE — 36000707: Mod: PO | Performed by: ORTHOPAEDIC SURGERY

## 2022-07-19 PROCEDURE — 25000003 PHARM REV CODE 250: Mod: PO | Performed by: PHYSICIAN ASSISTANT

## 2022-07-19 PROCEDURE — 64721 CARPAL TUNNEL SURGERY: CPT | Mod: RT,,, | Performed by: ORTHOPAEDIC SURGERY

## 2022-07-19 RX ORDER — IBUPROFEN 800 MG/1
800 TABLET ORAL EVERY 6 HOURS PRN
Qty: 8 TABLET | Refills: 0 | Status: SHIPPED | OUTPATIENT
Start: 2022-07-19 | End: 2023-04-20

## 2022-07-19 RX ORDER — LIDOCAINE HYDROCHLORIDE 10 MG/ML
INJECTION, SOLUTION EPIDURAL; INFILTRATION; INTRACAUDAL; PERINEURAL
Status: DISCONTINUED | OUTPATIENT
Start: 2022-07-19 | End: 2022-07-19 | Stop reason: HOSPADM

## 2022-07-19 RX ORDER — BUPIVACAINE HYDROCHLORIDE 2.5 MG/ML
INJECTION, SOLUTION EPIDURAL; INFILTRATION; INTRACAUDAL
Status: DISCONTINUED | OUTPATIENT
Start: 2022-07-19 | End: 2022-07-19 | Stop reason: HOSPADM

## 2022-07-19 RX ORDER — IBUPROFEN 600 MG/1
600 TABLET ORAL EVERY 6 HOURS PRN
Status: DISCONTINUED | OUTPATIENT
Start: 2022-07-19 | End: 2022-07-19 | Stop reason: HOSPADM

## 2022-07-19 RX ADMIN — IBUPROFEN 600 MG: 600 TABLET, FILM COATED ORAL at 08:07

## 2022-07-19 NOTE — OP NOTE
Yokasta Pratt  1980    DATE OF SURGERY: 7/19/2022     PRE-OPERATIVE DIAGNOSIS: right Carpal Tunnel Syndrome    POST-OPERATIVE DIAGNOSIS: right Carpal Tunnel Syndrome     ANESTHESIA TYPE: Local    BLOOD LOSS:  Less than 10 cc    TOURNIQUET TIME:  8 minutes    SURGEON: Dr. Kaiser    ASSISTANT: Yana Galeana    PROCEDURE: right Carpal Tunnel Release    IMPLANTS: None    SPECIMENS: None    INDICATION:     Ms. Pratt presented to my clinic with a history of right carpal tunnel symptoms. Conservative treatments were initially tried. The patient did have relief with attempts at conservative treatment however has had a return of symptoms. An EMG and nerve conduction study has been performed. That study has shown compression of the median nerve at the wrist consistent with carpal tunnel syndrome. A discussion of the risks and benefits of carpal tunnel release has been performed, and informed consent for the procedure has been obtained.    PROCEDURE IN DETAIL:     Ms. Pratt was transported to the operating room and was placed supine on the operating room table. All appropriate points were padded. The right hand and arm was prepped and draped in the normal sterile fashion. Time out was called. The correct patient, correct operative site, correct procedure, antibiotic administration which consisted of 2 g of Ancef, and allergies to medications which are to Codeine  were reviewed. Time in was then called.     Attention was turned to right hand where a 3 cm incision was made in the palm of the hand. This was carried through the skin and subcutaneous tissues were dissected bluntly. The superficial palmar fascia was identified and was split in line with its fibers. The transverse carpal ligament was then identified and was incised for a distance of approximately 1 cm sharply.The median nerve was visualized and a carpal tunnel sled was placed below the transverse carpal ligament but above the median nerve.  Blunt dissection proximally over the transverse carpal ligament was performed and elevator was placed just above the transverse carpal ligament proximally. The ligament was identified. There were noted to be no penetrating nerve branches and at that point the carpal tunnel knife was used to incise the proximal transverse carpal ligament. Attention was then turned to the distal portion of the transverse carpal ligament. The carpal tunnel sled was again placed underneath the transverse carpal ligament but above the median nerve distally. Blunt dissection above the transverse carpal ligament distally was performed and an elevator was placed. The distal portion of the transverse carpal ligament was visualized and there were noted to be no penetrating branches of the nerve. At that point, tenotomy scissors were used to transect the distal portion of the transverse carpal ligament under direct visualization. The median nerve was then visualized. There were noted to be no specific lesions of the nerve and all of its branches were noted to be intact. The wound was then irrigated copiously. The tourniquet was let down and meticulous hemostasis was obtained with bipolar cautery. The wound was closed with 4-0 nylon suture superficially. The wound was then dressed with Xeroform, 4 x 4's, cast padding and a 3 inch Ace wrap was placed.      The patient was stable in the operating room and was transported to the recovery room in stable condition. All lap, needle, sponge, and equipment counts were correct at the end of the case.    POST-OPERATIVE PLAN:     The patient will keep a soft dressing in place for two weeks at which time she will followup with me. The dressing will be taken down, and the sutures will be removed at that time. She is not to get the dressing wet or to take it off. She will have a 2 pound weight limit of the left upper extremity for a total of 4 weeks.

## 2022-07-19 NOTE — PATIENT INSTRUCTIONS
Procedure: right carpal tunnel release    1. Please keep the dressing clean, dry, and in place. Do not take it off and do not get it wet.    2. Please keep the right arm and hand elevated for the 1st 24-48 hours to prevent swelling    3. Flexion and extension of the exposed fingers is encouraged, but do not attempt to push off or lift more than 1-2 pounds with right arm or hand    4. Please limit weightbearing to the right hand to 1-2 pounds. Light activity such as brushing your teeth, using a fork, or lifting a small drink is allowed starting today.    5. Ibuprofen has been prescribed for pain. Please take them as necessary    6. If there are any questions or concerns please call Dr. Kaiser's office at [phone]    7.  Follow up with Dr. Kaiser in 2 weeks

## 2022-07-19 NOTE — H&P
2022    Chief Complaint:  No chief complaint on file.      HPI:  Yokasta Pratt is a 42 y.o. female, who presents to the surgery center today.  She is here to undergo a right carpal tunnel release.  She states that she continues to have symptoms of numbness tingling and pain.  She has no new complaints.    PMHX:  Past Medical History:   Diagnosis Date    Allergy     Anxiety     Endometriosis     Headache(784.0)     Infertility, female     PCOS (polycystic ovarian syndrome)     PONV (postoperative nausea and vomiting)     PVC's (premature ventricular contractions)         Sinusitis     Strep throat        PSHX:  Past Surgical History:   Procedure Laterality Date     SECTION Bilateral     cholecystectomy  ??    CHOLECYSTECTOMY      DILATION AND CURETTAGE OF UTERUS      laproscope      cleaned out endometriosis; 2016    TONSILLECTOMY      WISDOM TOOTH EXTRACTION         FMHX:  Family History   Problem Relation Age of Onset    Cancer Maternal Grandmother     Breast cancer Maternal Grandmother     Heart disease Maternal Grandfather     Cancer Paternal Grandmother     Hypertension Father        SOCHX:  Social History     Tobacco Use    Smoking status: Former Smoker     Packs/day: 0.25     Types: Cigarettes     Quit date: 2014     Years since quittin.1    Smokeless tobacco: Never Used    Tobacco comment: Quit in    Substance Use Topics    Alcohol use: No     Comment: weekends       ALLERGIES:  Codeine    CURRENT MEDICATIONS:  No current facility-administered medications on file prior to encounter.     Current Outpatient Medications on File Prior to Encounter   Medication Sig Dispense Refill    cetirizine (ZYRTEC) 10 MG tablet Take 10 mg by mouth once daily.      DULoxetine (CYMBALTA) 60 MG capsule Take 50 mg by mouth once daily.      ranitidine (ZANTAC) 150 MG capsule Take 150 mg by mouth once daily.      semaglutide (OZEMPIC) 0.25 mg or 0.5 mg(2  "mg/1.5 mL) pen injector Inject 0.5 mg into the skin every 7 days.      testosterone cypionate (DEPOTESTOTERONE CYPIONATE) 200 mg/mL injection SMARTSIG:Milliliter(s) IM      ARMOUR THYROID 30 mg Tab Take 30 mg by mouth once daily.      ibuprofen (ADVIL ORAL) Take by mouth.         REVIEW OF SYSTEMS:  ROS    GENERAL PHYSICAL EXAM:   /78 (BP Location: Right arm, Patient Position: Lying)   Pulse 105   Temp 98.2 °F (36.8 °C) (Skin)   Resp 18   Ht 5' 9" (1.753 m)   Wt 99.8 kg (220 lb)   LMP  (Approximate) Comment: January 2022  SpO2 96%   Breastfeeding No   BMI 32.49 kg/m²    GEN: well developed, well nourished, no acute distress   HENT: Normocephalic, atraumatic   EYES: No discharge, conjunctiva normal   NECK: Supple, non-tender   PULM: No wheezing, no respiratory distress   CV: RRR   ABD: Soft, non-tender    ORTHO EXAM:   Examination the right upper extremity reveals that there is no edema.  There are no skin changes.  Palpation does not produce tenderness.  He does report mild decreased sensation in the median distribution.  She does have a positive Tinel's and positive Durkan's test.  Has a 2+ radial pulse    RADIOLOGY:   None    ASSESSMENT:   Right carpal tunnel syndrome    PLAN:  1. I have discussed the risks and benefits of right carpal tunnel release with the patient.  Informed consent has been confirmed.    2. Will proceed with right carpal tunnel release under local anesthesia    3. Patient will follow up with me 2 weeks postoperatively  "

## 2022-07-19 NOTE — DISCHARGE SUMMARY
[facility] - Surgery  Discharge Note  Short Stay    Procedure(s) (LRB):  Right carpal tunnel release (Right)    OUTCOME: Patient tolerated treatment/procedure well without complication and is now ready for discharge.    DISPOSITION: Home or Self Care    FINAL DIAGNOSIS:  Carpal tunnel syndrome on right    FOLLOWUP: In clinic    DISCHARGE INSTRUCTIONS:    Discharge Procedure Orders   Diet general     Activity as tolerated     Keep surgical extremity elevated     Lifting restrictions   Order Comments: Please limit lifting with the right arm and hand to 1-2 lb     Leave dressing on - Keep it clean, dry, and intact until clinic visit     Call MD for:  temperature >100.4     Call MD for:  persistent nausea and vomiting     Call MD for:  severe uncontrolled pain     Call MD for:  difficulty breathing, headache or visual disturbances     Call MD for:  redness, tenderness, or signs of infection (pain, swelling, redness, odor or green/yellow discharge around incision site)     Call MD for:  hives     Call MD for:  persistent dizziness or light-headedness     Call MD for:  extreme fatigue        TIME SPENT ON DISCHARGE: 15 minutes

## 2022-08-01 ENCOUNTER — OFFICE VISIT (OUTPATIENT)
Dept: ORTHOPEDICS | Facility: CLINIC | Age: 42
End: 2022-08-01
Payer: COMMERCIAL

## 2022-08-01 VITALS — WEIGHT: 220 LBS | HEIGHT: 69 IN | BODY MASS INDEX: 32.58 KG/M2

## 2022-08-01 DIAGNOSIS — Z98.890 STATUS POST CARPAL TUNNEL RELEASE: Primary | ICD-10-CM

## 2022-08-01 PROCEDURE — 1159F MED LIST DOCD IN RCRD: CPT | Mod: CPTII,S$GLB,, | Performed by: ORTHOPAEDIC SURGERY

## 2022-08-01 PROCEDURE — 3008F BODY MASS INDEX DOCD: CPT | Mod: CPTII,S$GLB,, | Performed by: ORTHOPAEDIC SURGERY

## 2022-08-01 PROCEDURE — 99999 PR PBB SHADOW E&M-EST. PATIENT-LVL III: CPT | Mod: PBBFAC,,, | Performed by: ORTHOPAEDIC SURGERY

## 2022-08-01 PROCEDURE — 1159F PR MEDICATION LIST DOCUMENTED IN MEDICAL RECORD: ICD-10-PCS | Mod: CPTII,S$GLB,, | Performed by: ORTHOPAEDIC SURGERY

## 2022-08-01 PROCEDURE — 99024 POSTOP FOLLOW-UP VISIT: CPT | Mod: S$GLB,,, | Performed by: ORTHOPAEDIC SURGERY

## 2022-08-01 PROCEDURE — 99999 PR PBB SHADOW E&M-EST. PATIENT-LVL III: ICD-10-PCS | Mod: PBBFAC,,, | Performed by: ORTHOPAEDIC SURGERY

## 2022-08-01 PROCEDURE — 99024 PR POST-OP FOLLOW-UP VISIT: ICD-10-PCS | Mod: S$GLB,,, | Performed by: ORTHOPAEDIC SURGERY

## 2022-08-01 PROCEDURE — 3008F PR BODY MASS INDEX (BMI) DOCUMENTED: ICD-10-PCS | Mod: CPTII,S$GLB,, | Performed by: ORTHOPAEDIC SURGERY

## 2022-08-01 NOTE — PROGRESS NOTES
Yokasta Pratt is a 42 y.o. female who is approximately 2 weeks status post right carpal tunnel release. She is doing very well. She states that the majority of carpal tunnel symptoms are improved or resolved.    Physical exam: Examination of the right hand reveals that the incision is healing well. There is no significant edema,  erythema or drainage. Sensation is grossly intact median radial and ulnar distributions. Motor function of the thenar musculature is intact. Capillary refill less than 2 seconds in all of the digits. The Patient is able to make a full composite fist and fully extend the fingers without significant pain.    Assessment: Status post right carpal tunnel release    Plan:    1. Sutures were removed today and Steri-Strips are placed    2. Can begin hand washing in running water tomorrow    3. Continue a 2-3 pound weight limit to the arm and hand    4. Follow up with me in 2 weeks for repeat evaluation

## 2022-08-31 ENCOUNTER — PATIENT MESSAGE (OUTPATIENT)
Dept: ORTHOPEDICS | Facility: CLINIC | Age: 42
End: 2022-08-31
Payer: COMMERCIAL

## 2022-09-01 ENCOUNTER — PATIENT MESSAGE (OUTPATIENT)
Dept: ORTHOPEDICS | Facility: CLINIC | Age: 42
End: 2022-09-01
Payer: COMMERCIAL

## 2022-09-07 ENCOUNTER — OFFICE VISIT (OUTPATIENT)
Dept: ORTHOPEDICS | Facility: CLINIC | Age: 42
End: 2022-09-07
Payer: COMMERCIAL

## 2022-09-07 VITALS — BODY MASS INDEX: 32.58 KG/M2 | WEIGHT: 220 LBS | HEIGHT: 69 IN

## 2022-09-07 DIAGNOSIS — Z98.890 STATUS POST CARPAL TUNNEL RELEASE: Primary | ICD-10-CM

## 2022-09-07 PROCEDURE — 1160F RVW MEDS BY RX/DR IN RCRD: CPT | Mod: CPTII,S$GLB,, | Performed by: PHYSICIAN ASSISTANT

## 2022-09-07 PROCEDURE — 99999 PR PBB SHADOW E&M-EST. PATIENT-LVL III: CPT | Mod: PBBFAC,,, | Performed by: PHYSICIAN ASSISTANT

## 2022-09-07 PROCEDURE — 99999 PR PBB SHADOW E&M-EST. PATIENT-LVL III: ICD-10-PCS | Mod: PBBFAC,,, | Performed by: PHYSICIAN ASSISTANT

## 2022-09-07 PROCEDURE — 3008F BODY MASS INDEX DOCD: CPT | Mod: CPTII,S$GLB,, | Performed by: PHYSICIAN ASSISTANT

## 2022-09-07 PROCEDURE — 1160F PR REVIEW ALL MEDS BY PRESCRIBER/CLIN PHARMACIST DOCUMENTED: ICD-10-PCS | Mod: CPTII,S$GLB,, | Performed by: PHYSICIAN ASSISTANT

## 2022-09-07 PROCEDURE — 1159F PR MEDICATION LIST DOCUMENTED IN MEDICAL RECORD: ICD-10-PCS | Mod: CPTII,S$GLB,, | Performed by: PHYSICIAN ASSISTANT

## 2022-09-07 PROCEDURE — 99024 PR POST-OP FOLLOW-UP VISIT: ICD-10-PCS | Mod: S$GLB,,, | Performed by: PHYSICIAN ASSISTANT

## 2022-09-07 PROCEDURE — 3008F PR BODY MASS INDEX (BMI) DOCUMENTED: ICD-10-PCS | Mod: CPTII,S$GLB,, | Performed by: PHYSICIAN ASSISTANT

## 2022-09-07 PROCEDURE — 99024 POSTOP FOLLOW-UP VISIT: CPT | Mod: S$GLB,,, | Performed by: PHYSICIAN ASSISTANT

## 2022-09-07 PROCEDURE — 1159F MED LIST DOCD IN RCRD: CPT | Mod: CPTII,S$GLB,, | Performed by: PHYSICIAN ASSISTANT

## 2022-09-07 NOTE — PROGRESS NOTES
Yokasta Pratt is a 42 y.o. female who presents to clinic for follow-up status post right carpal tunnel release.  She is approximately 7 weeks status post surgery.  States overall she is doing very well.  States she does have some hypersensitivity over the surgical site.  States he also has some hypersensitivity over the ulnar portion of the right hand extending into the right little finger.  States the majority her carpal tunnel symptoms have resolved, but she continues to have some numbness of the right middle finger.  Denies any other complaints at this time.    Physical Exam:  Examination of the right hand reveals a well-healed surgical site. No edema, erythema, ecchymosis, or skin breakdown.  Mild hypersensitivity noted of the surgical site.  Hypersensitivity noted over the ulnar portion of the right hand that extends into the right little finger.  5/5 thenar strength.  5/5 /intrinsic strength. sensation is grossly intact in the radial, ulnar, and median nerve distributions.  Capillary refill less than 2 seconds in all fingers.    Radiology:  None.    Assessment:  Status post right carpal tunnel release    Plan:  1. I discussed with Yokasta Pratt that she is progressing appropriately in the postoperative course.  We discussed the best course of action this time is to begin gentle scar massage as well as some desensitization of the ulnar aspect of the right hand.  We did discuss the possibility performing occupational therapy, but she denied at this time in favor performing therapy activities on her own.  We discussed the reasonable course of action.  She was instructed to begin gentle scar massage and desensitization techniques.  We did discuss if she feels like she is not significantly improving to contact the clinic to refer her to formal occupational therapy.  She verbally agreed with the treatment plan.    2.  I would like her follow up in clinic in 4 weeks for repeat evaluation.   She was instructed to contact the clinic for any problems or concerns in the interim.

## 2023-01-26 ENCOUNTER — HOSPITAL ENCOUNTER (OUTPATIENT)
Dept: RADIOLOGY | Facility: HOSPITAL | Age: 43
Discharge: HOME | End: 2023-01-26
Attending: NURSE PRACTITIONER
Payer: COMMERCIAL

## 2023-01-26 DIAGNOSIS — Z12.31 ENCOUNTER FOR SCREENING MAMMOGRAM FOR MALIGNANT NEOPLASM OF BREAST: ICD-10-CM

## 2023-01-26 PROCEDURE — 77063 BREAST TOMOSYNTHESIS BI: CPT | Mod: 26,,, | Performed by: RADIOLOGY

## 2023-01-26 PROCEDURE — 77067 SCR MAMMO BI INCL CAD: CPT | Mod: 26,,, | Performed by: RADIOLOGY

## 2023-01-26 PROCEDURE — 77063 MAMMO DIGITAL SCREENING BILAT WITH TOMO: ICD-10-PCS | Mod: 26,,, | Performed by: RADIOLOGY

## 2023-01-26 PROCEDURE — 77067 MAMMO DIGITAL SCREENING BILAT WITH TOMO: ICD-10-PCS | Mod: 26,,, | Performed by: RADIOLOGY

## 2023-01-26 PROCEDURE — 77067 SCR MAMMO BI INCL CAD: CPT | Mod: TC,PO

## 2023-02-28 ENCOUNTER — OFFICE VISIT (OUTPATIENT)
Dept: URGENT CARE | Facility: CLINIC | Age: 43
End: 2023-02-28
Payer: COMMERCIAL

## 2023-02-28 VITALS
DIASTOLIC BLOOD PRESSURE: 90 MMHG | HEIGHT: 69 IN | RESPIRATION RATE: 16 BRPM | WEIGHT: 220 LBS | OXYGEN SATURATION: 100 % | HEART RATE: 120 BPM | SYSTOLIC BLOOD PRESSURE: 139 MMHG | BODY MASS INDEX: 32.58 KG/M2 | TEMPERATURE: 98 F

## 2023-02-28 DIAGNOSIS — R06.2 WHEEZING: ICD-10-CM

## 2023-02-28 DIAGNOSIS — J18.0 BRONCHOPNEUMONIA: ICD-10-CM

## 2023-02-28 DIAGNOSIS — R05.9 COUGH, UNSPECIFIED TYPE: Primary | ICD-10-CM

## 2023-02-28 PROCEDURE — 71046 XR CHEST PA AND LATERAL: ICD-10-PCS | Mod: S$GLB,,, | Performed by: RADIOLOGY

## 2023-02-28 PROCEDURE — 1159F MED LIST DOCD IN RCRD: CPT | Mod: CPTII,S$GLB,, | Performed by: EMERGENCY MEDICINE

## 2023-02-28 PROCEDURE — 99203 OFFICE O/P NEW LOW 30 MIN: CPT | Mod: S$GLB,,, | Performed by: EMERGENCY MEDICINE

## 2023-02-28 PROCEDURE — 3008F BODY MASS INDEX DOCD: CPT | Mod: CPTII,S$GLB,, | Performed by: EMERGENCY MEDICINE

## 2023-02-28 PROCEDURE — 3075F PR MOST RECENT SYSTOLIC BLOOD PRESS GE 130-139MM HG: ICD-10-PCS | Mod: CPTII,S$GLB,, | Performed by: EMERGENCY MEDICINE

## 2023-02-28 PROCEDURE — 71046 X-RAY EXAM CHEST 2 VIEWS: CPT | Mod: S$GLB,,, | Performed by: RADIOLOGY

## 2023-02-28 PROCEDURE — 1160F PR REVIEW ALL MEDS BY PRESCRIBER/CLIN PHARMACIST DOCUMENTED: ICD-10-PCS | Mod: CPTII,S$GLB,, | Performed by: EMERGENCY MEDICINE

## 2023-02-28 PROCEDURE — 3008F PR BODY MASS INDEX (BMI) DOCUMENTED: ICD-10-PCS | Mod: CPTII,S$GLB,, | Performed by: EMERGENCY MEDICINE

## 2023-02-28 PROCEDURE — 1160F RVW MEDS BY RX/DR IN RCRD: CPT | Mod: CPTII,S$GLB,, | Performed by: EMERGENCY MEDICINE

## 2023-02-28 PROCEDURE — 3075F SYST BP GE 130 - 139MM HG: CPT | Mod: CPTII,S$GLB,, | Performed by: EMERGENCY MEDICINE

## 2023-02-28 PROCEDURE — 3080F DIAST BP >= 90 MM HG: CPT | Mod: CPTII,S$GLB,, | Performed by: EMERGENCY MEDICINE

## 2023-02-28 PROCEDURE — 3080F PR MOST RECENT DIASTOLIC BLOOD PRESSURE >= 90 MM HG: ICD-10-PCS | Mod: CPTII,S$GLB,, | Performed by: EMERGENCY MEDICINE

## 2023-02-28 PROCEDURE — 1159F PR MEDICATION LIST DOCUMENTED IN MEDICAL RECORD: ICD-10-PCS | Mod: CPTII,S$GLB,, | Performed by: EMERGENCY MEDICINE

## 2023-02-28 PROCEDURE — 99203 PR OFFICE/OUTPT VISIT, NEW, LEVL III, 30-44 MIN: ICD-10-PCS | Mod: S$GLB,,, | Performed by: EMERGENCY MEDICINE

## 2023-02-28 RX ORDER — ALBUTEROL SULFATE 90 UG/1
2 AEROSOL, METERED RESPIRATORY (INHALATION) EVERY 6 HOURS PRN
Qty: 18 G | Refills: 0 | Status: SHIPPED | OUTPATIENT
Start: 2023-02-28 | End: 2023-04-20

## 2023-02-28 RX ORDER — PREDNISONE 20 MG/1
TABLET ORAL
Qty: 7 TABLET | Refills: 0 | Status: SHIPPED | OUTPATIENT
Start: 2023-02-28 | End: 2023-03-05

## 2023-02-28 NOTE — PROGRESS NOTES
"Subjective:       Patient ID: Yokasta Pratt is a 42 y.o. female.    Vitals:  height is 5' 9" (1.753 m) and weight is 99.8 kg (220 lb). Her temperature is 98.4 °F (36.9 °C). Her blood pressure is 139/90 (abnormal) and her pulse is 120 (abnormal). Her respiration is 16 and oxygen saturation is 100%.     Chief Complaint: Cough    Pt presents with cough, congestion, headache sob, body aches x 5 days. Pt is covid vacs with no known exposure and a negative at [facility] test. Otc taken with mild relief and pain scale is 2/10. Pt has alresdy taken a z pack and is now on omni.     Cough  This is a new problem. The current episode started in the past 7 days. The problem has been unchanged. The problem occurs hourly. The cough is Non-productive. Associated symptoms include headaches. The symptoms are aggravated by lying down. Treatments tried: musinex, advil.     Respiratory:  Positive for cough.    Neurological:  Positive for headaches.     Objective:      Physical Exam   Constitutional: She is oriented to person, place, and time. She appears well-developed. She is cooperative.  Non-toxic appearance. She does not appear ill. No distress.   HENT:   Head: Normocephalic and atraumatic.   Ears:   Right Ear: Hearing and external ear normal.   Left Ear: Hearing and external ear normal.   Nose: Nose normal. No mucosal edema, rhinorrhea or nasal deformity. No epistaxis. Right sinus exhibits no maxillary sinus tenderness and no frontal sinus tenderness. Left sinus exhibits no maxillary sinus tenderness and no frontal sinus tenderness.   Mouth/Throat: Uvula is midline, oropharynx is clear and moist and mucous membranes are normal. No trismus in the jaw. Normal dentition. No uvula swelling. No oropharyngeal exudate, posterior oropharyngeal edema or posterior oropharyngeal erythema.      Comments: Scant posterior pharyngeal erythema with postnasal drip.  Eyes: Conjunctivae and lids are normal. No scleral icterus.   Neck: Trachea " normal and phonation normal. Neck supple. No edema present. No erythema present. No neck rigidity present.   Cardiovascular: Normal rate, regular rhythm, normal heart sounds and normal pulses.   Pulmonary/Chest: Effort normal. No respiratory distress. She has no decreased breath sounds. She has wheezes. She has no rhonchi.         Comments: Good air movement with scant inspiratory wheezing heard in the upper lung fields.    Abdominal: Normal appearance.   Musculoskeletal: Normal range of motion.         General: No deformity. Normal range of motion.      Right lower leg: No edema.      Left lower leg: No edema.   Neurological: She is alert and oriented to person, place, and time. She exhibits normal muscle tone. Coordination normal.   Skin: Skin is warm, dry, intact, not diaphoretic and not pale.   Psychiatric: Her speech is normal and behavior is normal. Judgment and thought content normal.   Nursing note and vitals reviewed.    Patient with 5 day plus history of cough and congestion.  Took Z-Wild with no relief and switch to Omnicef.  Patient took 1 neb treatment from her children.  She has also taken Mucinex D.  Her heart rate is 120.  She says her heart rate always runs high and is worse after Mucinex and breathing treatment.  She has no peripheral edema.  She has no shortness of breath.  She is hearing wheezing herself.  Possible subjective fever.  Exam reveals inspiratory wheezing with good air movement in upper lung fields.  Picture is that of bronchitis.  Will do chest x-ray.  Patient has already taken course of Zithromax to cover atypical bacteria.  I agree with Omnicef.  Will add steroid and albuterol inhaler.    Chest x-ray is unremarkable although I can not rule out air bronchogram in the right lower lung.  Will treat as pneumonia.  Patient is already completed a course of Zithromax and is now taking cefdinir.  Will assure at least a 5 day course.    Assessment:       1. Cough, unspecified type    2.  Wheezing    3. Bronchopneumonia          Plan:         Cough, unspecified type  -     XR CHEST PA AND LATERAL; Future; Expected date: 02/28/2023  -     predniSONE (DELTASONE) 20 MG tablet; Take 1 tablet (20 mg total) by mouth 2 (two) times daily for 2 days, THEN 1 tablet (20 mg total) once daily for 3 days.  Dispense: 7 tablet; Refill: 0  -     albuterol (VENTOLIN HFA) 90 mcg/actuation inhaler; Inhale 2 puffs into the lungs every 6 (six) hours as needed for Wheezing. Rescue  Dispense: 18 g; Refill: 0    Wheezing    Bronchopneumonia

## 2023-02-28 NOTE — PATIENT INSTRUCTIONS
Go to emergency department if symptoms worsen.  Follow-up with your primary care doctor.  Make sure to take entire course of Omnicef.

## 2023-03-02 ENCOUNTER — CLINICAL SUPPORT (OUTPATIENT)
Dept: URGENT CARE | Facility: CLINIC | Age: 43
End: 2023-03-02
Payer: COMMERCIAL

## 2023-03-02 VITALS
TEMPERATURE: 99 F | DIASTOLIC BLOOD PRESSURE: 94 MMHG | HEIGHT: 69 IN | BODY MASS INDEX: 32.58 KG/M2 | OXYGEN SATURATION: 100 % | WEIGHT: 220 LBS | HEART RATE: 105 BPM | SYSTOLIC BLOOD PRESSURE: 135 MMHG

## 2023-03-02 DIAGNOSIS — R06.2 WHEEZING: Primary | ICD-10-CM

## 2023-03-02 DIAGNOSIS — J40 BRONCHITIS: ICD-10-CM

## 2023-03-02 PROCEDURE — 99213 PR OFFICE/OUTPT VISIT, EST, LEVL III, 20-29 MIN: ICD-10-PCS | Mod: S$GLB,,, | Performed by: EMERGENCY MEDICINE

## 2023-03-02 PROCEDURE — 99213 OFFICE O/P EST LOW 20 MIN: CPT | Mod: S$GLB,,, | Performed by: EMERGENCY MEDICINE

## 2023-03-02 RX ORDER — IPRATROPIUM BROMIDE AND ALBUTEROL SULFATE 2.5; .5 MG/3ML; MG/3ML
3 SOLUTION RESPIRATORY (INHALATION) EVERY 4 HOURS PRN
Qty: 75 ML | Refills: 1 | Status: SHIPPED | OUTPATIENT
Start: 2023-03-02 | End: 2023-04-20

## 2023-03-02 NOTE — PROGRESS NOTES
"Subjective:       Patient ID: Yokasta Pratt is a 42 y.o. female.    Vitals:  height is 5' 9" (1.753 m) and weight is 99.8 kg (220 lb). Her temperature is 98.9 °F (37.2 °C). Her blood pressure is 135/94 (abnormal) and her pulse is 105. Her oxygen saturation is 100%.     Chief Complaint: Cough    Cough and wheezing has continued   Patient was seen 02/28, cough and congestion had started a couple days before that   Patient has been taking steroids, is on her 3rd day, is using her inhaler and taking Omincef   Patient states she still has green snot and ears still feel congestion     Other  This is a recurrent problem. The current episode started in the past 7 days. The problem occurs constantly. The problem has been gradually worsening. Associated symptoms include congestion and coughing. Treatments tried: OTC meds. The treatment provided no relief.     HENT:  Positive for congestion.    Respiratory:  Positive for cough.      Objective:      Physical Exam   Constitutional: She is oriented to person, place, and time. She appears well-developed. She is cooperative.  Non-toxic appearance. She does not appear ill. No distress.   HENT:   Head: Normocephalic and atraumatic.   Ears:   Right Ear: Hearing, tympanic membrane, external ear and ear canal normal.   Left Ear: Hearing, tympanic membrane, external ear and ear canal normal.   Nose: Congestion present. No mucosal edema, rhinorrhea or nasal deformity. No epistaxis. Right sinus exhibits no maxillary sinus tenderness and no frontal sinus tenderness. Left sinus exhibits no maxillary sinus tenderness and no frontal sinus tenderness.   Mouth/Throat: Uvula is midline, oropharynx is clear and moist and mucous membranes are normal. Mucous membranes are moist. No trismus in the jaw. Normal dentition. No uvula swelling. No oropharyngeal exudate, posterior oropharyngeal edema or posterior oropharyngeal erythema.      Comments: Yellow postnasal drip bilaterally.  Eyes: " Conjunctivae and lids are normal. No scleral icterus.   Neck: Trachea normal and phonation normal. Neck supple. No edema present. No erythema present. No neck rigidity present.   Cardiovascular: Normal rate, regular rhythm, normal heart sounds and normal pulses.   Pulmonary/Chest: Effort normal. No respiratory distress. She has no decreased breath sounds. She has wheezes. She has no rhonchi. She has no rales.         Comments: Positive expiratory wheezing in upper lung fields bilaterally.  No respiratory distress.  Pulse ox on room air is 100%.  Respiratory rate is 16.    Abdominal: Normal appearance.   Musculoskeletal: Normal range of motion.         General: No deformity. Normal range of motion.      Right lower leg: No edema.      Left lower leg: No edema.   Neurological: She is alert and oriented to person, place, and time. She exhibits normal muscle tone. Coordination normal.   Skin: Skin is warm, dry, intact, not diaphoretic and not pale.   Psychiatric: Her speech is normal and behavior is normal. Judgment and thought content normal.   Nursing note and vitals reviewed.        Patient has bronchitis with wheezing.  She is presently taking steroids.  She was taking a Ventolin inhaler.  She does have a home nebulizer.  Will add DuoNeb to her regimen.  She should seek medical care if symptoms worsen.  She has not completed her antibiotics.  Assessment:       1. Wheezing    2. Bronchitis          Plan:         Wheezing  -     albuterol-ipratropium (DUO-NEB) 2.5 mg-0.5 mg/3 mL nebulizer solution; Take 3 mLs by nebulization every 4 (four) hours as needed for Wheezing.  Dispense: 75 mL; Refill: 1    Bronchitis

## 2023-03-09 ENCOUNTER — CLINICAL SUPPORT (OUTPATIENT)
Dept: URGENT CARE | Facility: CLINIC | Age: 43
End: 2023-03-09
Payer: COMMERCIAL

## 2023-03-09 VITALS
BODY MASS INDEX: 32.58 KG/M2 | DIASTOLIC BLOOD PRESSURE: 102 MMHG | WEIGHT: 220 LBS | HEIGHT: 69 IN | TEMPERATURE: 99 F | SYSTOLIC BLOOD PRESSURE: 142 MMHG | RESPIRATION RATE: 18 BRPM | HEART RATE: 88 BPM | OXYGEN SATURATION: 99 %

## 2023-03-09 DIAGNOSIS — R05.9 COUGH, UNSPECIFIED TYPE: Primary | ICD-10-CM

## 2023-03-09 PROCEDURE — 99213 OFFICE O/P EST LOW 20 MIN: CPT | Mod: S$GLB,,, | Performed by: PHYSICIAN ASSISTANT

## 2023-03-09 PROCEDURE — 99213 PR OFFICE/OUTPT VISIT, EST, LEVL III, 20-29 MIN: ICD-10-PCS | Mod: S$GLB,,, | Performed by: PHYSICIAN ASSISTANT

## 2023-03-09 RX ORDER — PREDNISONE 10 MG/1
TABLET ORAL
Qty: 11 TABLET | Refills: 0 | Status: SHIPPED | OUTPATIENT
Start: 2023-03-09 | End: 2023-04-20

## 2023-03-09 RX ORDER — SPIRONOLACTONE 100 MG/1
100 TABLET, FILM COATED ORAL
COMMUNITY
Start: 2022-12-20 | End: 2023-05-09

## 2023-03-09 RX ORDER — BENZONATATE 200 MG/1
200 CAPSULE ORAL 3 TIMES DAILY PRN
Qty: 30 CAPSULE | Refills: 0 | Status: SHIPPED | OUTPATIENT
Start: 2023-03-09 | End: 2023-03-19

## 2023-03-09 NOTE — PATIENT INSTRUCTIONS

## 2023-03-09 NOTE — PROGRESS NOTES
"Subjective:       Patient ID: Yokasta Pratt is a 42 y.o. female.    Vitals:  height is 5' 9" (1.753 m) and weight is 99.8 kg (220 lb). Her temperature is 99 °F (37.2 °C). Her blood pressure is 142/102 (abnormal) and her pulse is 88. Her respiration is 18 and oxygen saturation is 99%.     Chief Complaint: Cough    Patient was here on 3/02/23. Her symptoms have slightly improved, but she is still experiencing several symptoms. They include cough, congestion, headache. She has been struggling with these symptoms for 2 weeks.  Patient is already taking 1 round of azithromycin and a 2nd round of Omnicef that was given to her by another physician.    Cough  This is a new problem. The current episode started 1 to 4 weeks ago. The problem has been gradually improving. The problem occurs hourly. The cough is Productive of sputum. Associated symptoms include nasal congestion and postnasal drip. Nothing aggravates the symptoms. She has tried nothing for the symptoms.     HENT:  Positive for postnasal drip.    Respiratory:  Positive for cough.      Objective:      Physical Exam   Constitutional: She does not appear ill. No distress.   HENT:   Head: Normocephalic and atraumatic.   Ears:   Right Ear: External ear normal.   Left Ear: External ear normal.   Nose: Right sinus exhibits no maxillary sinus tenderness and no frontal sinus tenderness. Left sinus exhibits no maxillary sinus tenderness and no frontal sinus tenderness.   Mouth/Throat: Mucous membranes are moist. No oropharyngeal exudate or posterior oropharyngeal erythema. Oropharynx is clear.   Eyes: Conjunctivae are normal. Right eye exhibits no discharge. Left eye exhibits no discharge. Extraocular movement intact   Cardiovascular: Normal rate, regular rhythm and normal heart sounds.   No murmur heard.  Pulmonary/Chest: Effort normal and breath sounds normal. She has no wheezes. She has no rhonchi. She has no rales.   Abdominal: Normal appearance. "   Musculoskeletal: Normal range of motion.         General: Normal range of motion.   Neurological: no focal deficit. She is alert.   Skin: Skin is warm, dry and not pale. jaundice  Psychiatric: Her behavior is normal. Mood, judgment and thought content normal.   Nursing note and vitals reviewed.      Assessment:       1. Cough, unspecified type          Plan:         Cough, unspecified type    Other orders  -     predniSONE (DELTASONE) 10 MG tablet; Take 40mg for 1 days, take 30mg for 1 days, take 20mg for 1 days, take 10mg for 2 days  Dispense: 11 tablet; Refill: 0  -     benzonatate (TESSALON) 200 MG capsule; Take 1 capsule (200 mg total) by mouth 3 (three) times daily as needed for Cough.  Dispense: 30 capsule; Refill: 0    Lungs clear to auscultation bilaterally.  Will do another small steroid taper and send her home with some Tessalon Perles.  Her symptoms are improving.  Discussed potential for postviral cough syndrome and that this may last.  As long as she is afebrile and not having any difficulty breathing or coughing up brown likely she is at the tail end of illness.  Will check in with me in a couple of days to report on symptoms.     Patient Instructions   You must understand that you've received an Urgent Care treatment only and that you may be released before all your medical problems are known or treated. You, the patient, will arrange for follow up care as instructed.  Follow up with your PCP or specialty clinic as directed in the next 1-2 weeks if not improved or as needed.  You can call [phone] to schedule an appointment with the appropriate provider.  If your condition worsens we recommend that you receive another evaluation at the emergency room immediately or contact your primary medical clinics after hours call service to discuss your concerns.  Please return here or go to the Emergency Department for any concerns or worsening of condition.

## 2023-04-20 ENCOUNTER — OFFICE VISIT (OUTPATIENT)
Dept: FAMILY MEDICINE | Facility: CLINIC | Age: 43
End: 2023-04-20
Payer: COMMERCIAL

## 2023-04-20 VITALS
WEIGHT: 217.38 LBS | OXYGEN SATURATION: 99 % | BODY MASS INDEX: 32.2 KG/M2 | HEART RATE: 97 BPM | HEIGHT: 69 IN | DIASTOLIC BLOOD PRESSURE: 80 MMHG | SYSTOLIC BLOOD PRESSURE: 118 MMHG

## 2023-04-20 DIAGNOSIS — Z13.1 ENCOUNTER FOR SCREENING FOR DIABETES MELLITUS: ICD-10-CM

## 2023-04-20 DIAGNOSIS — Z11.59 ENCOUNTER FOR HEPATITIS C SCREENING TEST FOR LOW RISK PATIENT: ICD-10-CM

## 2023-04-20 DIAGNOSIS — Z11.4 ENCOUNTER FOR SCREENING FOR HUMAN IMMUNODEFICIENCY VIRUS (HIV): ICD-10-CM

## 2023-04-20 DIAGNOSIS — R51.9 FRONTAL HEADACHE: ICD-10-CM

## 2023-04-20 DIAGNOSIS — Z00.00 PREVENTATIVE HEALTH CARE: ICD-10-CM

## 2023-04-20 DIAGNOSIS — Z13.220 ENCOUNTER FOR SCREENING FOR LIPID DISORDER: ICD-10-CM

## 2023-04-20 DIAGNOSIS — Z86.39 HISTORY OF THYROID DISEASE: ICD-10-CM

## 2023-04-20 DIAGNOSIS — J32.0 CHRONIC MAXILLARY SINUSITIS: Primary | ICD-10-CM

## 2023-04-20 PROBLEM — O14.90 PRE-ECLAMPSIA: Status: RESOLVED | Noted: 2018-02-05 | Resolved: 2023-04-20

## 2023-04-20 PROBLEM — I10 HYPERTENSION: Status: RESOLVED | Noted: 2018-03-14 | Resolved: 2023-04-20

## 2023-04-20 PROBLEM — O09.522 ELDERLY MULTIGRAVIDA IN SECOND TRIMESTER: Status: RESOLVED | Noted: 2017-11-28 | Resolved: 2023-04-20

## 2023-04-20 PROBLEM — G56.01 CARPAL TUNNEL SYNDROME ON RIGHT: Status: RESOLVED | Noted: 2022-07-19 | Resolved: 2023-04-20

## 2023-04-20 PROBLEM — R80.9 PROTEIN IN URINE: Status: RESOLVED | Noted: 2018-01-29 | Resolved: 2023-04-20

## 2023-04-20 PROBLEM — G57.61 MORTON'S NEUROMA OF SECOND INTERSPACE OF RIGHT FOOT: Status: RESOLVED | Noted: 2020-12-01 | Resolved: 2023-04-20

## 2023-04-20 PROBLEM — O30.042 DICHORIONIC DIAMNIOTIC TWIN PREGNANCY IN SECOND TRIMESTER: Status: RESOLVED | Noted: 2017-11-28 | Resolved: 2023-04-20

## 2023-04-20 PROBLEM — O16.2 HYPERTENSION AFFECTING PREGNANCY IN SECOND TRIMESTER: Status: RESOLVED | Noted: 2018-01-15 | Resolved: 2023-04-20

## 2023-04-20 PROBLEM — I34.1 MITRAL VALVE PROLAPSE: Status: RESOLVED | Noted: 2018-10-31 | Resolved: 2023-04-20

## 2023-04-20 PROBLEM — N89.8 VAGINAL DISCHARGE: Status: RESOLVED | Noted: 2017-12-13 | Resolved: 2023-04-20

## 2023-04-20 PROBLEM — R00.2 PALPITATIONS: Status: RESOLVED | Noted: 2018-10-31 | Resolved: 2023-04-20

## 2023-04-20 PROBLEM — R10.9 ABDOMINAL CRAMPING: Status: RESOLVED | Noted: 2018-01-13 | Resolved: 2023-04-20

## 2023-04-20 PROBLEM — O47.9 IRREGULAR CONTRACTIONS: Status: RESOLVED | Noted: 2018-03-02 | Resolved: 2023-04-20

## 2023-04-20 PROBLEM — E01.0 THYROMEGALY: Status: RESOLVED | Noted: 2018-10-31 | Resolved: 2023-04-20

## 2023-04-20 PROBLEM — O16.9 HYPERTENSION AFFECTING PREGNANCY: Status: RESOLVED | Noted: 2018-01-12 | Resolved: 2023-04-20

## 2023-04-20 PROBLEM — I49.3 PVC'S (PREMATURE VENTRICULAR CONTRACTIONS): Status: RESOLVED | Noted: 2022-01-05 | Resolved: 2023-04-20

## 2023-04-20 PROCEDURE — 99999 PR PBB SHADOW E&M-EST. PATIENT-LVL IV: ICD-10-PCS | Mod: PBBFAC,,, | Performed by: STUDENT IN AN ORGANIZED HEALTH CARE EDUCATION/TRAINING PROGRAM

## 2023-04-20 PROCEDURE — 3008F PR BODY MASS INDEX (BMI) DOCUMENTED: ICD-10-PCS | Mod: CPTII,S$GLB,, | Performed by: STUDENT IN AN ORGANIZED HEALTH CARE EDUCATION/TRAINING PROGRAM

## 2023-04-20 PROCEDURE — 3079F DIAST BP 80-89 MM HG: CPT | Mod: CPTII,S$GLB,, | Performed by: STUDENT IN AN ORGANIZED HEALTH CARE EDUCATION/TRAINING PROGRAM

## 2023-04-20 PROCEDURE — 3008F BODY MASS INDEX DOCD: CPT | Mod: CPTII,S$GLB,, | Performed by: STUDENT IN AN ORGANIZED HEALTH CARE EDUCATION/TRAINING PROGRAM

## 2023-04-20 PROCEDURE — 1159F MED LIST DOCD IN RCRD: CPT | Mod: CPTII,S$GLB,, | Performed by: STUDENT IN AN ORGANIZED HEALTH CARE EDUCATION/TRAINING PROGRAM

## 2023-04-20 PROCEDURE — 3074F SYST BP LT 130 MM HG: CPT | Mod: CPTII,S$GLB,, | Performed by: STUDENT IN AN ORGANIZED HEALTH CARE EDUCATION/TRAINING PROGRAM

## 2023-04-20 PROCEDURE — 3074F PR MOST RECENT SYSTOLIC BLOOD PRESSURE < 130 MM HG: ICD-10-PCS | Mod: CPTII,S$GLB,, | Performed by: STUDENT IN AN ORGANIZED HEALTH CARE EDUCATION/TRAINING PROGRAM

## 2023-04-20 PROCEDURE — 99213 PR OFFICE/OUTPT VISIT, EST, LEVL III, 20-29 MIN: ICD-10-PCS | Mod: 25,S$GLB,, | Performed by: STUDENT IN AN ORGANIZED HEALTH CARE EDUCATION/TRAINING PROGRAM

## 2023-04-20 PROCEDURE — 1159F PR MEDICATION LIST DOCUMENTED IN MEDICAL RECORD: ICD-10-PCS | Mod: CPTII,S$GLB,, | Performed by: STUDENT IN AN ORGANIZED HEALTH CARE EDUCATION/TRAINING PROGRAM

## 2023-04-20 PROCEDURE — 99396 PR PREVENTIVE VISIT,EST,40-64: ICD-10-PCS | Mod: S$GLB,,, | Performed by: STUDENT IN AN ORGANIZED HEALTH CARE EDUCATION/TRAINING PROGRAM

## 2023-04-20 PROCEDURE — 99213 OFFICE O/P EST LOW 20 MIN: CPT | Mod: 25,S$GLB,, | Performed by: STUDENT IN AN ORGANIZED HEALTH CARE EDUCATION/TRAINING PROGRAM

## 2023-04-20 PROCEDURE — 99999 PR PBB SHADOW E&M-EST. PATIENT-LVL IV: CPT | Mod: PBBFAC,,, | Performed by: STUDENT IN AN ORGANIZED HEALTH CARE EDUCATION/TRAINING PROGRAM

## 2023-04-20 PROCEDURE — 99396 PREV VISIT EST AGE 40-64: CPT | Mod: S$GLB,,, | Performed by: STUDENT IN AN ORGANIZED HEALTH CARE EDUCATION/TRAINING PROGRAM

## 2023-04-20 PROCEDURE — 3079F PR MOST RECENT DIASTOLIC BLOOD PRESSURE 80-89 MM HG: ICD-10-PCS | Mod: CPTII,S$GLB,, | Performed by: STUDENT IN AN ORGANIZED HEALTH CARE EDUCATION/TRAINING PROGRAM

## 2023-04-20 NOTE — PROGRESS NOTES
Plan:     Yokasta was seen today for [facility].    Diagnoses and all orders for this visit:    Chronic maxillary sinusitis  -     Ambulatory referral/consult to ENT; Future    Frontal headache  -     Ambulatory referral/consult to ENT; Future    History of thyroid disease  -     TSH; Future    Preventative health care  -     Comprehensive Metabolic Panel; Future  -     CBC Auto Differential; Future    Encounter for screening for lipid disorder  -     Lipid Panel; Future    Encounter for screening for diabetes mellitus  -     Hemoglobin A1C; Future    Encounter for hepatitis C screening test for low risk patient  -     Hepatitis C Antibody; Future    Encounter for screening for human immunodeficiency virus (HIV)  -     HIV 1/2 Ag/Ab (4th Gen); Future       Follow up in about 4 weeks (around 5/18/2023), or if symptoms worsen or fail to improve.    Lesly Mustafa MD  04/20/2023    Subjective:      Patient ID: Yokasta Pratt is a 42 y.o. female    Chief Complaint   Patient presents with    Establish Care     Est car, yearly check up      HPI  42 y.o. female with a PMHx as documented below presents to clinic today for the following:    Headaches:   - Headaches usually bilateral, frontal headaches  - Associated w/ bilateral sinus pressure (tender to palpation), possibly related to stress (twin 6yo) and muscle tension (neck)  - Pt reports taking ibuprofen most days in addition to daily Zyrtec (does not use Flonase), although pt reports sometimes going a week at a time without NSAIDs    PCOS:   - Spironolactone 100 mg daily   - Ozempic 0.5 mg subQ weekly  - Previously on testosterone replacement  - Follows with and treatment managed by OBGYN    EDNA:   - Cymbalta, 30 mg every morning and 20 mg every evening    ROS  Constitutional:  Negative for chills and fever.   Respiratory:  Negative for shortness of breath.    Cardiovascular:  Negative for chest pain.   Gastrointestinal:  Negative for abdominal pain,  constipation, diarrhea, nausea and vomiting.     Current Outpatient Medications   Medication Instructions    cetirizine (ZYRTEC) 10 mg, Oral, Daily    DULoxetine (CYMBALTA) 50 mg, Oral, Daily    OZEMPIC 0.5 mg, Subcutaneous, Every 7 days    ranitidine (ZANTAC) 150 mg, Oral, Daily    spironolactone (ALDACTONE) 100 mg, Oral      Past Medical History:   Diagnosis Date    Anxiety     Carpal tunnel syndrome on right 2022    s/p surgery    Chronic tonsillitis 2014    Dichorionic diamniotic twin pregnancy 2017    Endometriosis     Greater trochanteric bursitis 2014    Hypertension affecting pregnancy 2018    Infertility, female     Mitral valve prolapse 10/31/2018    Baxter's neuroma of left foot 11/15/2016    Baxter's neuroma of second interspace of right foot 2020    Palpitations 10/31/2018    PCOS (polycystic ovarian syndrome)     PONV (postoperative nausea and vomiting)     Pre-eclampsia 2018    PVC's (premature ventricular contractions)         Thyromegaly 10/31/2018     Past Surgical History:   Procedure Laterality Date    CARPAL TUNNEL RELEASE Right 2022    Procedure: Right carpal tunnel release;  Surgeon: Junior Kaiser MD;  Location: [facility] OR;  Service: Orthopedics;  Laterality: Right;     SECTION      CHOLECYSTECTOMY      approx     DILATION AND CURETTAGE OF UTERUS      SURGICAL REMOVAL OF ENDOMETRIOSIS      laparoscopy    TONSILLECTOMY      WISDOM TOOTH EXTRACTION       Review of patient's allergies indicates:   Allergen Reactions    Codeine Other (See Comments)     Makes patient hyper and talks gibberish     Family History   Problem Relation Age of Onset    Hypertension Father     Breast cancer Maternal Grandmother     Heart disease Maternal Grandfather     Cancer Paternal Grandmother      Social History     Tobacco Use    Smoking status: Former     Packs/day: 0.25     Years: 4.00     Pack years: 1.00     Types: Cigarettes     Start date:  "2010     Quit date: 2014     Years since quittin.8    Smokeless tobacco: Never    Tobacco comments:     Quit in    Substance Use Topics    Alcohol use: Yes     Alcohol/week: 5.0 standard drinks     Types: 5 Drinks containing 0.5 oz of alcohol per week     Comment: weekends    Drug use: No     Currently on File with [facility]:   Most Recent Immunizations   Administered Date(s) Administered    COVID-19, MRNA, LN-S, PF (Pfizer) (Purple Cap) 2021    Influenza 2018    Influenza - Quadrivalent - PF *Preferred* (6 months and older) 2021    Tdap 2018     Objective:      Vitals:    23 0933   BP: 118/80   Pulse: 97   SpO2: 99%   Weight: 98.6 kg (217 lb 6 oz)   Height: 5' 9" (1.753 m)     Body mass index is 32.1 kg/m².    Physical Exam   Constitutional:       General: No acute distress.  HENT:      Head: Normocephalic and atraumatic.   Pulmonary:      Effort: Pulmonary effort is normal. No respiratory distress.   Neurological:      General: No focal deficit present.      Mental Status: Alert and oriented to person, place, and time. Mental status is at baseline.    Assessment:       1. Chronic maxillary sinusitis    2. Frontal headache    3. History of thyroid disease    4. Preventative health care    5. Encounter for screening for lipid disorder    6. Encounter for screening for diabetes mellitus    7. Encounter for hepatitis C screening test for low risk patient    8. Encounter for screening for human immunodeficiency virus (HIV)        Lesly Mustafa MD  [facility]  Office: [phone]   Fax: [phone]  2023      Disclaimer: This note was partly generated using dictation software which may occasionally result in transcription errors.    Total time spent on this encounter includes face to face time and non-face to face time preparing to see the patient (eg, review of tests), obtaining and/or reviewing separately obtained history, " documenting clinical information in the electronic or other health record, independently interpreting results, and communicating results to the patient/family/caregiver, or care coordinator.

## 2023-04-27 ENCOUNTER — OFFICE VISIT (OUTPATIENT)
Dept: OTOLARYNGOLOGY | Facility: CLINIC | Age: 43
End: 2023-04-27
Payer: COMMERCIAL

## 2023-04-27 ENCOUNTER — LAB VISIT (OUTPATIENT)
Dept: LAB | Facility: HOSPITAL | Age: 43
End: 2023-04-27
Attending: STUDENT IN AN ORGANIZED HEALTH CARE EDUCATION/TRAINING PROGRAM
Payer: COMMERCIAL

## 2023-04-27 VITALS — HEIGHT: 69 IN | BODY MASS INDEX: 32.16 KG/M2 | WEIGHT: 217.13 LBS

## 2023-04-27 DIAGNOSIS — Z00.00 PREVENTATIVE HEALTH CARE: ICD-10-CM

## 2023-04-27 DIAGNOSIS — J32.0 CHRONIC MAXILLARY SINUSITIS: ICD-10-CM

## 2023-04-27 DIAGNOSIS — Z13.220 ENCOUNTER FOR SCREENING FOR LIPID DISORDER: ICD-10-CM

## 2023-04-27 DIAGNOSIS — Z13.1 ENCOUNTER FOR SCREENING FOR DIABETES MELLITUS: ICD-10-CM

## 2023-04-27 DIAGNOSIS — Z11.59 ENCOUNTER FOR HEPATITIS C SCREENING TEST FOR LOW RISK PATIENT: ICD-10-CM

## 2023-04-27 DIAGNOSIS — Z86.39 HISTORY OF THYROID DISEASE: ICD-10-CM

## 2023-04-27 DIAGNOSIS — Z11.4 ENCOUNTER FOR SCREENING FOR HUMAN IMMUNODEFICIENCY VIRUS (HIV): ICD-10-CM

## 2023-04-27 DIAGNOSIS — R51.9 FRONTAL HEADACHE: ICD-10-CM

## 2023-04-27 LAB
ALBUMIN SERPL BCP-MCNC: 4.3 G/DL (ref 3.5–5.2)
ALP SERPL-CCNC: 72 U/L (ref 55–135)
ALT SERPL W/O P-5'-P-CCNC: 27 U/L (ref 10–44)
ANION GAP SERPL CALC-SCNC: 11 MMOL/L (ref 8–16)
AST SERPL-CCNC: 22 U/L (ref 10–40)
BASOPHILS # BLD AUTO: 0.04 K/UL (ref 0–0.2)
BASOPHILS NFR BLD: 0.6 % (ref 0–1.9)
BILIRUB SERPL-MCNC: 1.1 MG/DL (ref 0.1–1)
BUN SERPL-MCNC: 15 MG/DL (ref 6–20)
CALCIUM SERPL-MCNC: 10.1 MG/DL (ref 8.7–10.5)
CHLORIDE SERPL-SCNC: 102 MMOL/L (ref 95–110)
CHOLEST SERPL-MCNC: 203 MG/DL (ref 120–199)
CHOLEST/HDLC SERPL: 4.3 {RATIO} (ref 2–5)
CO2 SERPL-SCNC: 26 MMOL/L (ref 23–29)
CREAT SERPL-MCNC: 0.9 MG/DL (ref 0.5–1.4)
DIFFERENTIAL METHOD: ABNORMAL
EOSINOPHIL # BLD AUTO: 0.2 K/UL (ref 0–0.5)
EOSINOPHIL NFR BLD: 2.2 % (ref 0–8)
ERYTHROCYTE [DISTWIDTH] IN BLOOD BY AUTOMATED COUNT: 12.2 % (ref 11.5–14.5)
EST. GFR  (NO RACE VARIABLE): >60 ML/MIN/1.73 M^2
ESTIMATED AVG GLUCOSE: 100 MG/DL (ref 68–131)
GLUCOSE SERPL-MCNC: 92 MG/DL (ref 70–110)
HBA1C MFR BLD: 5.1 % (ref 4–5.6)
HCT VFR BLD AUTO: 47.5 % (ref 37–48.5)
HCV AB SERPL QL IA: NORMAL
HDLC SERPL-MCNC: 47 MG/DL (ref 40–75)
HDLC SERPL: 23.2 % (ref 20–50)
HGB BLD-MCNC: 15.1 G/DL (ref 12–16)
HIV 1+2 AB+HIV1 P24 AG SERPL QL IA: NORMAL
IMM GRANULOCYTES # BLD AUTO: 0.02 K/UL (ref 0–0.04)
IMM GRANULOCYTES NFR BLD AUTO: 0.3 % (ref 0–0.5)
LDLC SERPL CALC-MCNC: 139.2 MG/DL (ref 63–159)
LYMPHOCYTES # BLD AUTO: 2.3 K/UL (ref 1–4.8)
LYMPHOCYTES NFR BLD: 32.7 % (ref 18–48)
MCH RBC QN AUTO: 28.6 PG (ref 27–31)
MCHC RBC AUTO-ENTMCNC: 31.8 G/DL (ref 32–36)
MCV RBC AUTO: 90 FL (ref 82–98)
MONOCYTES # BLD AUTO: 0.5 K/UL (ref 0.3–1)
MONOCYTES NFR BLD: 7.3 % (ref 4–15)
NEUTROPHILS # BLD AUTO: 4 K/UL (ref 1.8–7.7)
NEUTROPHILS NFR BLD: 56.9 % (ref 38–73)
NONHDLC SERPL-MCNC: 156 MG/DL
NRBC BLD-RTO: 0 /100 WBC
PLATELET # BLD AUTO: 289 K/UL (ref 150–450)
PMV BLD AUTO: 11 FL (ref 9.2–12.9)
POTASSIUM SERPL-SCNC: 4.6 MMOL/L (ref 3.5–5.1)
PROT SERPL-MCNC: 7.1 G/DL (ref 6–8.4)
RBC # BLD AUTO: 5.28 M/UL (ref 4–5.4)
SODIUM SERPL-SCNC: 139 MMOL/L (ref 136–145)
TRIGL SERPL-MCNC: 84 MG/DL (ref 30–150)
TSH SERPL DL<=0.005 MIU/L-ACNC: 1.25 UIU/ML (ref 0.4–4)
WBC # BLD AUTO: 6.94 K/UL (ref 3.9–12.7)

## 2023-04-27 PROCEDURE — 80053 COMPREHEN METABOLIC PANEL: CPT | Performed by: STUDENT IN AN ORGANIZED HEALTH CARE EDUCATION/TRAINING PROGRAM

## 2023-04-27 PROCEDURE — 87389 HIV-1 AG W/HIV-1&-2 AB AG IA: CPT | Performed by: STUDENT IN AN ORGANIZED HEALTH CARE EDUCATION/TRAINING PROGRAM

## 2023-04-27 PROCEDURE — 99999 PR PBB SHADOW E&M-EST. PATIENT-LVL IV: CPT | Mod: PBBFAC,,, | Performed by: NURSE PRACTITIONER

## 2023-04-27 PROCEDURE — 80061 LIPID PANEL: CPT | Performed by: STUDENT IN AN ORGANIZED HEALTH CARE EDUCATION/TRAINING PROGRAM

## 2023-04-27 PROCEDURE — 36415 COLL VENOUS BLD VENIPUNCTURE: CPT | Mod: PO | Performed by: STUDENT IN AN ORGANIZED HEALTH CARE EDUCATION/TRAINING PROGRAM

## 2023-04-27 PROCEDURE — 1159F MED LIST DOCD IN RCRD: CPT | Mod: CPTII,S$GLB,, | Performed by: NURSE PRACTITIONER

## 2023-04-27 PROCEDURE — 83036 HEMOGLOBIN GLYCOSYLATED A1C: CPT | Performed by: STUDENT IN AN ORGANIZED HEALTH CARE EDUCATION/TRAINING PROGRAM

## 2023-04-27 PROCEDURE — 85025 COMPLETE CBC W/AUTO DIFF WBC: CPT | Performed by: STUDENT IN AN ORGANIZED HEALTH CARE EDUCATION/TRAINING PROGRAM

## 2023-04-27 PROCEDURE — 99999 PR PBB SHADOW E&M-EST. PATIENT-LVL IV: ICD-10-PCS | Mod: PBBFAC,,, | Performed by: NURSE PRACTITIONER

## 2023-04-27 PROCEDURE — 99203 OFFICE O/P NEW LOW 30 MIN: CPT | Mod: S$GLB,,, | Performed by: NURSE PRACTITIONER

## 2023-04-27 PROCEDURE — 1159F PR MEDICATION LIST DOCUMENTED IN MEDICAL RECORD: ICD-10-PCS | Mod: CPTII,S$GLB,, | Performed by: NURSE PRACTITIONER

## 2023-04-27 PROCEDURE — 99203 PR OFFICE/OUTPT VISIT, NEW, LEVL III, 30-44 MIN: ICD-10-PCS | Mod: S$GLB,,, | Performed by: NURSE PRACTITIONER

## 2023-04-27 PROCEDURE — 3008F PR BODY MASS INDEX (BMI) DOCUMENTED: ICD-10-PCS | Mod: CPTII,S$GLB,, | Performed by: NURSE PRACTITIONER

## 2023-04-27 PROCEDURE — 84443 ASSAY THYROID STIM HORMONE: CPT | Performed by: STUDENT IN AN ORGANIZED HEALTH CARE EDUCATION/TRAINING PROGRAM

## 2023-04-27 PROCEDURE — 3008F BODY MASS INDEX DOCD: CPT | Mod: CPTII,S$GLB,, | Performed by: NURSE PRACTITIONER

## 2023-04-27 PROCEDURE — 86803 HEPATITIS C AB TEST: CPT | Performed by: STUDENT IN AN ORGANIZED HEALTH CARE EDUCATION/TRAINING PROGRAM

## 2023-04-27 RX ORDER — SEMAGLUTIDE 1.34 MG/ML
INJECTION, SOLUTION SUBCUTANEOUS
COMMUNITY
Start: 2022-05-16 | End: 2023-05-09 | Stop reason: SDUPTHER

## 2023-04-27 RX ORDER — DULOXETIN HYDROCHLORIDE 20 MG/1
20 CAPSULE, DELAYED RELEASE ORAL NIGHTLY
COMMUNITY
Start: 2023-03-20

## 2023-04-27 RX ORDER — DULOXETIN HYDROCHLORIDE 30 MG/1
30 CAPSULE, DELAYED RELEASE ORAL
COMMUNITY
Start: 2023-03-20

## 2023-04-27 RX ORDER — AZITHROMYCIN 250 MG/1
TABLET, FILM COATED ORAL
COMMUNITY
Start: 2023-02-23

## 2023-04-27 NOTE — PATIENT INSTRUCTIONS
"ENT SINUS REGIMEN:    "ENT-APPROVED" NASAL SPRAYS:  Flonase / fluticasone / Nasacort / Rhinocort (steroid spray) is best for stuffy, pressure, fullness.  Astelin / azelastine (antihistamine spray) is best for itchy, drippy, sneezy.    Use as directed, spraying 1-2 times in each nostril each day. It may take 2-3 days to 2-3 weeks to begin seeing improvement. This medication needs to be taken consistently to see results. Overall, this is a well-tolerated medication with low side effects. The benefit of nasal steroids as opposed to oral steroids is that the nasal steroid spray works primarily in the nose. Common side effects can include: headache, nasal dryness, minor nose bleed.  Rare side effects may include:  septal perforation, elevation in eye pressure, dry eyes, change in smell, allergic reaction.  Notify your provider if you have any concerns or experience these symptoms.     Nasal spray instructions:  Blow nose first gently to clean. Keep chin level with the floor (do not tilt head forward or back). Using the opposite hand (example: right hand for left nostril, left hand for right nostril) insert nasal spray taking caution to direct it AWAY from the middle wall inside the nose (septum) to avoid irritating nasal septum which could cause nosebleed.  Do not tilt spray up but rather flat and out along the roof of your mouth to spray. Angle the tip of the spray out slightly toward the direction of the ears; then spray. Do not take quick vigorous sniff but rather slow gentle inhalation while waiting for medication to absorb into nasal passages. Then administer second spray in same way.     Nasal saline rinse kit (use Neti pot or PLUQ sinus rinse kit) -- Rinse your sinuses once to twice daily to reduce the allergen burden in your nose. Use sterile water (boiled tap water which has cooled) or distilled bottled water. Add 1/4 teaspoon sea salt and a pinch of baking soda or a mixture packet from the maker of your " sinus rinse kit.  Rinse through both sides of nose to cleanse sinus and nasal passages, bending forward with head tilted down. Keep your mouth open, without holding your breath. Squeeze bottle gently until solution starts draining from the opposite nasal passage. After bottle is empty, blow nose very gently, without pinching nose completely, to avoid pressure on eardrums.  There are useful YouTube videos that show demonstration of how to do these properly.     Ponaris Nasal Emollient is used for the relief of: nasal congestion due to colds, nasal irritation, allergy exacerbations, nasal crusting. Specifically prepared iodized organic oils of pine, eucalyptus, peppermint, cajeput, and cottonseed. To order Ponaris: ask your pharmacist to order it for you or we carry it in our pharmacy downstairs on the first floor.

## 2023-04-27 NOTE — PROGRESS NOTES
Subjective     Patient ID: Yokasta Pratt is a 42 y.o. female.    Chief Complaint: No chief complaint on file.    HPI  Patient is new to ENT, last seen in ENT in 2014 for tonsillectomy by Dr. Whittington, referred now by Dr. Mustafa for consultation for chronic maxillary sinusitis. Patient states she has been treated for sinusitis at least 4 times in the past 12 months. She usually receives antibiotics through Hugh Fontenot's office (her mother worked for him for many years). Patient also received prednisone and albuterol 2 months ago for [place] [facility]. Patient saw Dr. Mustafa one week ago for headaches; referred to ENT.  Patient was allergy tested 20 years ago by Dr. Farhan Venegas; allergic to cats, dogs, and pollen. Takes Zyrtec and Nasacort daily.  Recent imaging reviewed: none available. Patient denies significant allergic stigmata:  No itchy, red, watery eyes; no itchy, red, watery nose; no excessive sneezing or stuffiness. Patient denies s/s of acute bacterial sinusitis:  No mucopus from nose or throat, no facial swelling/pain, no dental pain, no diminished olfaction/taste, no headaches around the eyes, no sore throat or productive cough.     Review of Systems   Constitutional: Negative.  Negative for fever.   HENT: Negative.  Negative for nasal congestion, dental problem, facial swelling, postnasal drip, rhinorrhea, sinus pressure/congestion, sneezing, sore throat, trouble swallowing and voice change.    Eyes: Negative.  Negative for discharge, redness and itching.   Respiratory: Negative.  Negative for cough and choking.    Cardiovascular: Negative.    Gastrointestinal: Negative.    Musculoskeletal: Negative.    Integumentary:  Negative.   Neurological:  Positive for headaches.   Hematological: Negative.    Psychiatric/Behavioral: Negative.          Objective     Physical Exam  Vitals and nursing note reviewed.   Constitutional:       General: She is not in acute distress.      Appearance: She is well-developed. She is not ill-appearing or diaphoretic.   HENT:      Head: Normocephalic and atraumatic.      Right Ear: Hearing, tympanic membrane, ear canal and external ear normal. No middle ear effusion. Tympanic membrane is not erythematous.      Left Ear: Hearing, tympanic membrane, ear canal and external ear normal.  No middle ear effusion. Tympanic membrane is not erythematous.      Nose: Nose normal. No septal deviation, mucosal edema, congestion or rhinorrhea.      Right Sinus: No maxillary sinus tenderness or frontal sinus tenderness.      Left Sinus: No maxillary sinus tenderness or frontal sinus tenderness.      Mouth/Throat:      Mouth: Mucous membranes are not pale, not dry and not cyanotic. No oral lesions.      Tongue: No lesions.      Palate: No lesions.      Pharynx: Uvula midline. No oropharyngeal exudate or posterior oropharyngeal erythema.      Tonsils: 0 on the right. 0 on the left.   Eyes:      General: Lids are normal. No scleral icterus.        Right eye: No discharge.         Left eye: No discharge.   Neck:      Thyroid: No thyroid mass or thyromegaly.      Trachea: Trachea normal. No tracheal deviation.   Cardiovascular:      Rate and Rhythm: Normal rate.   Pulmonary:      Effort: Pulmonary effort is normal. No respiratory distress.      Breath sounds: Normal air entry.   Musculoskeletal:         General: Normal range of motion.      Cervical back: Normal range of motion and neck supple.   Lymphadenopathy:      Head:      Right side of head: No submental, submandibular, tonsillar, preauricular or posterior auricular adenopathy.      Left side of head: No submental, submandibular, tonsillar, preauricular or posterior auricular adenopathy.      Cervical: No cervical adenopathy.      Right cervical: No superficial or posterior cervical adenopathy.     Left cervical: No superficial or posterior cervical adenopathy.   Skin:     General: Skin is warm and dry.       Coloration: Skin is not pale.      Findings: No lesion or rash.   Neurological:      Mental Status: She is alert and oriented to person, place, and time.      Motor: Motor function is intact.      Coordination: Coordination is intact.      Gait: Gait normal.   Psychiatric:         Attention and Perception: Attention normal.         Mood and Affect: Mood normal.         Speech: Speech normal.         Behavior: Behavior normal. Behavior is cooperative.        Assessment and Plan     Problem List Items Addressed This Visit       Frontal headache    Relevant Orders    CT Sinuses without Contrast    Chronic maxillary sinusitis    Relevant Orders    CT Sinuses without Contrast   CT sinus -- will notify pt of results as soon as available. If (+) for sinusitis, ENT will treat. If (-) for sinusitis, will refer to our Headache Dept.     In the meantime continue daily allergy regimen of Flonase, Zyrtec, saline.   Patient encouraged to return to clinic if symptoms worsen/persist and as needed for further ENT symptoms or concerns.

## 2023-05-03 ENCOUNTER — HOSPITAL ENCOUNTER (OUTPATIENT)
Dept: RADIOLOGY | Facility: HOSPITAL | Age: 43
Discharge: HOME | End: 2023-05-03
Attending: NURSE PRACTITIONER
Payer: COMMERCIAL

## 2023-05-03 DIAGNOSIS — R51.9 FRONTAL HEADACHE: ICD-10-CM

## 2023-05-03 DIAGNOSIS — J32.0 CHRONIC MAXILLARY SINUSITIS: ICD-10-CM

## 2023-05-03 PROCEDURE — 70486 CT SINUSES WITHOUT CONTRAST: ICD-10-PCS | Mod: 26,,, | Performed by: RADIOLOGY

## 2023-05-03 PROCEDURE — 70486 CT MAXILLOFACIAL W/O DYE: CPT | Mod: 26,,, | Performed by: RADIOLOGY

## 2023-05-03 PROCEDURE — 70486 CT MAXILLOFACIAL W/O DYE: CPT | Mod: TC,PO

## 2023-05-09 ENCOUNTER — OFFICE VISIT (OUTPATIENT)
Dept: FAMILY MEDICINE | Facility: CLINIC | Age: 43
End: 2023-05-09
Payer: COMMERCIAL

## 2023-05-09 VITALS
SYSTOLIC BLOOD PRESSURE: 128 MMHG | WEIGHT: 214.31 LBS | DIASTOLIC BLOOD PRESSURE: 76 MMHG | BODY MASS INDEX: 31.74 KG/M2 | RESPIRATION RATE: 18 BRPM | HEIGHT: 69 IN

## 2023-05-09 DIAGNOSIS — E28.2 PCOS (POLYCYSTIC OVARIAN SYNDROME): ICD-10-CM

## 2023-05-09 DIAGNOSIS — K21.9 GASTROESOPHAGEAL REFLUX DISEASE, UNSPECIFIED WHETHER ESOPHAGITIS PRESENT: ICD-10-CM

## 2023-05-09 DIAGNOSIS — Z71.2 ENCOUNTER TO DISCUSS TEST RESULTS: Primary | ICD-10-CM

## 2023-05-09 PROBLEM — R51.9 FRONTAL HEADACHE: Status: RESOLVED | Noted: 2023-04-20 | Resolved: 2023-05-09

## 2023-05-09 PROBLEM — J32.0 CHRONIC MAXILLARY SINUSITIS: Chronic | Status: ACTIVE | Noted: 2023-04-20

## 2023-05-09 PROBLEM — Z86.39 HISTORY OF THYROID DISEASE: Chronic | Status: ACTIVE | Noted: 2023-04-20

## 2023-05-09 PROCEDURE — 99999 PR PBB SHADOW E&M-EST. PATIENT-LVL III: CPT | Mod: PBBFAC,,, | Performed by: STUDENT IN AN ORGANIZED HEALTH CARE EDUCATION/TRAINING PROGRAM

## 2023-05-09 PROCEDURE — 3074F PR MOST RECENT SYSTOLIC BLOOD PRESSURE < 130 MM HG: ICD-10-PCS | Mod: CPTII,S$GLB,, | Performed by: STUDENT IN AN ORGANIZED HEALTH CARE EDUCATION/TRAINING PROGRAM

## 2023-05-09 PROCEDURE — 3008F BODY MASS INDEX DOCD: CPT | Mod: CPTII,S$GLB,, | Performed by: STUDENT IN AN ORGANIZED HEALTH CARE EDUCATION/TRAINING PROGRAM

## 2023-05-09 PROCEDURE — 1159F MED LIST DOCD IN RCRD: CPT | Mod: CPTII,S$GLB,, | Performed by: STUDENT IN AN ORGANIZED HEALTH CARE EDUCATION/TRAINING PROGRAM

## 2023-05-09 PROCEDURE — 99214 PR OFFICE/OUTPT VISIT, EST, LEVL IV, 30-39 MIN: ICD-10-PCS | Mod: S$GLB,,, | Performed by: STUDENT IN AN ORGANIZED HEALTH CARE EDUCATION/TRAINING PROGRAM

## 2023-05-09 PROCEDURE — 99999 PR PBB SHADOW E&M-EST. PATIENT-LVL III: ICD-10-PCS | Mod: PBBFAC,,, | Performed by: STUDENT IN AN ORGANIZED HEALTH CARE EDUCATION/TRAINING PROGRAM

## 2023-05-09 PROCEDURE — 3078F PR MOST RECENT DIASTOLIC BLOOD PRESSURE < 80 MM HG: ICD-10-PCS | Mod: CPTII,S$GLB,, | Performed by: STUDENT IN AN ORGANIZED HEALTH CARE EDUCATION/TRAINING PROGRAM

## 2023-05-09 PROCEDURE — 3074F SYST BP LT 130 MM HG: CPT | Mod: CPTII,S$GLB,, | Performed by: STUDENT IN AN ORGANIZED HEALTH CARE EDUCATION/TRAINING PROGRAM

## 2023-05-09 PROCEDURE — 3044F PR MOST RECENT HEMOGLOBIN A1C LEVEL <7.0%: ICD-10-PCS | Mod: CPTII,S$GLB,, | Performed by: STUDENT IN AN ORGANIZED HEALTH CARE EDUCATION/TRAINING PROGRAM

## 2023-05-09 PROCEDURE — 1159F PR MEDICATION LIST DOCUMENTED IN MEDICAL RECORD: ICD-10-PCS | Mod: CPTII,S$GLB,, | Performed by: STUDENT IN AN ORGANIZED HEALTH CARE EDUCATION/TRAINING PROGRAM

## 2023-05-09 PROCEDURE — 3044F HG A1C LEVEL LT 7.0%: CPT | Mod: CPTII,S$GLB,, | Performed by: STUDENT IN AN ORGANIZED HEALTH CARE EDUCATION/TRAINING PROGRAM

## 2023-05-09 PROCEDURE — 3078F DIAST BP <80 MM HG: CPT | Mod: CPTII,S$GLB,, | Performed by: STUDENT IN AN ORGANIZED HEALTH CARE EDUCATION/TRAINING PROGRAM

## 2023-05-09 PROCEDURE — 99214 OFFICE O/P EST MOD 30 MIN: CPT | Mod: S$GLB,,, | Performed by: STUDENT IN AN ORGANIZED HEALTH CARE EDUCATION/TRAINING PROGRAM

## 2023-05-09 PROCEDURE — 3008F PR BODY MASS INDEX (BMI) DOCUMENTED: ICD-10-PCS | Mod: CPTII,S$GLB,, | Performed by: STUDENT IN AN ORGANIZED HEALTH CARE EDUCATION/TRAINING PROGRAM

## 2023-05-09 RX ORDER — SEMAGLUTIDE 1.34 MG/ML
1 INJECTION, SOLUTION SUBCUTANEOUS
Qty: 3 ML | Refills: 2 | Status: SHIPPED | OUTPATIENT
Start: 2023-05-09 | End: 2023-08-07

## 2023-05-09 RX ORDER — HYDROGEN PEROXIDE 3 %
20 SOLUTION, NON-ORAL MISCELLANEOUS
Qty: 30 CAPSULE | Refills: 11 | Status: SHIPPED | OUTPATIENT
Start: 2023-05-09 | End: 2024-03-05

## 2023-05-09 NOTE — TELEPHONE ENCOUNTER
Refill Routing Note   Medication(s) are not appropriate for processing by [facility] for the following reason(s):      See Pharmacy Comment: PA Request    [facility] action(s):  Defer Care Due:  None identified   Medication Therapy Plan: Pharmacy comment: Alternative Requested:NOT COVERED, STEP THERAPY REQUIRED PER INS.      Appointments  past 12m or future 3m with PCP    Date Provider   Last Visit   5/9/2023 Lesly Mustafa MD   Next Visit   Visit date not found Lesly Mustafa MD   ED visits in past 90 days: 0        Note composed:3:53 PM 05/09/2023

## 2023-05-09 NOTE — TELEPHONE ENCOUNTER
No care due was identified.  [facility] Embedded Care Due Messages. Reference number: [id number].   5/09/2023 9:16:53 AM CDT

## 2023-05-09 NOTE — TELEPHONE ENCOUNTER
Pharmacy is requesting that a Prior Authorization be completed for the Ozempic. Please forward this request to the staff member handling PAs for your clinic. Thank you.

## 2023-05-09 NOTE — PROGRESS NOTES
Plan:      Yokasta was seen today for medication problem.    Diagnoses and all orders for this visit:    Encounter to discuss test results    Gastroesophageal reflux disease, unspecified whether esophagitis present  -     esomeprazole (NEXIUM) 20 MG capsule; Take 1 capsule (20 mg total) by mouth before breakfast.    PCOS (polycystic ovarian syndrome)  -     semaglutide (OZEMPIC) 1 mg/dose (2 mg/1.5 mL) PnIj; Inject 1 mg into the skin every 7 days.      Follow up in 6 months (on 11/9/2023), or if symptoms worsen or fail to improve.    Lesly Mustafa MD  05/09/2023    Subjective:      Patient ID: Yokasta Pratt is a 42 y.o. female    Chief Complaint   Patient presents with    Medication Problem     HPI  42 y.o. female with a PMHx as documented below presents to clinic today for the following:    Recently stopped taking spironolactone due to low blood pressures after stopping testosterone - BP has returned to normal with no episodes of hypertension or hypotension.    Reviewed most recent test results - all questions answered, pt expressed understanding.     Headaches:   - Headaches usually bilateral, frontal headaches  - Associated w/ bilateral sinus pressure (tender to palpation), possibly related to stress (twin 4yo) and muscle tension (neck)  - Pt reports taking ibuprofen most days in addition to daily Zyrtec (does not use Flonase), although pt reports sometimes going a week at a time without NSAIDs  - S/p ENT eval including CT sinuses - evidence of chronic maxillary sinusitis, no acute infection  - Headaches have improved w/ cessation of testosterone use and with ENT recommendations of daily sinus rinses and Flonase - now only needing to take ibuprofen 1x/week    PCOS:   - Ozempic 0.5 mg subQ weekly  - Previously on testosterone replacement  - Follows with and treatment managed by OBGYN     EDNA:   - Cymbalta, 30 mg every morning and 20 mg every evening    ROS  Constitutional:  Negative for chills and  "fever.   Respiratory:  Negative for shortness of breath.    Cardiovascular:  Negative for chest pain.   Gastrointestinal:  Negative for abdominal pain, constipation, diarrhea, nausea and vomiting.     Current Outpatient Medications   Medication Instructions    azithromycin (Z-CHIOMA) 250 MG tablet TAKE 2 TABLETS BY MOUTH TODAY, THEN TAKE 1 TABLET DAILY FOR 4 DAYS    cetirizine (ZYRTEC) 10 mg, Oral, Daily    DULoxetine (CYMBALTA) 20 mg, Oral, Nightly    DULoxetine (CYMBALTA) 30 mg, Oral    esomeprazole (NEXIUM) 20 mg, Oral, Before breakfast    OZEMPIC 1 mg, Subcutaneous, Every 7 days    ranitidine (ZANTAC) 150 mg, Oral, Daily      Past Medical History:   Diagnosis Date    Anxiety     Carpal tunnel syndrome on right 07/19/2022    s/p surgery    Chronic maxillary sinusitis 4/20/2023    Chronic tonsillitis 06/09/2014    Dichorionic diamniotic twin pregnancy 11/28/2017    Endometriosis     Gastroesophageal reflux disease 5/9/2023    Greater trochanteric bursitis 09/12/2014    Hypertension affecting pregnancy 01/12/2018    Infertility, female     Mitral valve prolapse 10/31/2018    Baxter's neuroma of left foot 11/15/2016    Baxter's neuroma of second interspace of right foot 12/01/2020    Palpitations 10/31/2018    PCOS (polycystic ovarian syndrome)     PONV (postoperative nausea and vomiting)     Pre-eclampsia 02/05/2018    PVC's (premature ventricular contractions)     2022    Thyromegaly 10/31/2018      Objective:      Vitals:    05/09/23 0841   BP: 128/76   BP Location: Right arm   Patient Position: Sitting   Resp: 18   Weight: 97.2 kg (214 lb 4.6 oz)   Height: 5' 9" (1.753 m)     Body mass index is 31.64 kg/m².    Physical Exam   Constitutional:       General: No acute distress.  HENT:      Head: Normocephalic and atraumatic.   Pulmonary:      Effort: Pulmonary effort is normal. No respiratory distress.   Neurological:      General: No focal deficit present.      Mental Status: Alert and oriented to person, place, and " time. Mental status is at baseline.    Assessment:       1. Encounter to discuss test results    2. Gastroesophageal reflux disease, unspecified whether esophagitis present    3. PCOS (polycystic ovarian syndrome)        Lesly Mustafa MD  [facility]  Office: [phone]   Fax: [phone]  05/09/2023      Disclaimer: This note was partly generated using dictation software which may occasionally result in transcription errors.    Total time spent on this encounter includes face to face time and non-face to face time preparing to see the patient (eg, review of tests), obtaining and/or reviewing separately obtained history, documenting clinical information in the electronic or other health record, independently interpreting results, and communicating results to the patient/family/caregiver, or care coordinator.

## 2023-05-10 ENCOUNTER — PATIENT MESSAGE (OUTPATIENT)
Dept: FAMILY MEDICINE | Facility: CLINIC | Age: 43
End: 2023-05-10
Payer: COMMERCIAL

## 2023-05-10 RX ORDER — SEMAGLUTIDE 1.34 MG/ML
1 INJECTION, SOLUTION SUBCUTANEOUS
Qty: 3 ML | Refills: 11 | OUTPATIENT
Start: 2023-05-10 | End: 2024-05-09

## 2023-05-11 ENCOUNTER — PATIENT MESSAGE (OUTPATIENT)
Dept: FAMILY MEDICINE | Facility: CLINIC | Age: 43
End: 2023-05-11
Payer: COMMERCIAL

## 2023-05-11 DIAGNOSIS — E28.2 PCOS (POLYCYSTIC OVARIAN SYNDROME): ICD-10-CM

## 2023-05-11 NOTE — TELEPHONE ENCOUNTER
No care due was identified.  [facility] Embedded Care Due Messages. Reference number: [id number].   5/11/2023 1:23:28 PM CDT

## 2023-05-11 NOTE — TELEPHONE ENCOUNTER
Refill Routing Note   Medication(s) are not appropriate for processing by [facility] for the following reason(s):      Requesting prior authorization    [facility] action(s):  Defer None identified     Medication Therapy Plan: Requesting PA      Appointments  past 12m or future 3m with PCP    Date Provider   Last Visit   5/9/2023 Lesly Mustafa MD   Next Visit   Visit date not found Lesly Mustafa MD   ED visits in past 90 days: 0        Note composed:2:44 PM 05/11/2023

## 2023-05-17 ENCOUNTER — PATIENT MESSAGE (OUTPATIENT)
Dept: OTOLARYNGOLOGY | Facility: CLINIC | Age: 43
End: 2023-05-17
Payer: COMMERCIAL

## 2023-05-24 ENCOUNTER — TELEPHONE (OUTPATIENT)
Dept: FAMILY MEDICINE | Facility: CLINIC | Age: 43
End: 2023-05-24
Payer: COMMERCIAL

## 2023-05-26 RX ORDER — SEMAGLUTIDE 1.34 MG/ML
INJECTION, SOLUTION SUBCUTANEOUS
Qty: 3 EACH | Refills: 2 | OUTPATIENT
Start: 2023-05-26

## 2023-11-09 ENCOUNTER — OFFICE VISIT (OUTPATIENT)
Dept: FAMILY MEDICINE | Facility: CLINIC | Age: 43
End: 2023-11-09
Payer: COMMERCIAL

## 2023-11-09 ENCOUNTER — HOSPITAL ENCOUNTER (OUTPATIENT)
Dept: RADIOLOGY | Facility: HOSPITAL | Age: 43
Discharge: HOME | End: 2023-11-09
Attending: STUDENT IN AN ORGANIZED HEALTH CARE EDUCATION/TRAINING PROGRAM
Payer: COMMERCIAL

## 2023-11-09 DIAGNOSIS — R35.0 URINARY FREQUENCY: ICD-10-CM

## 2023-11-09 DIAGNOSIS — N30.01 ACUTE CYSTITIS WITH HEMATURIA: ICD-10-CM

## 2023-11-09 DIAGNOSIS — Z87.442 HISTORY OF NEPHROLITHIASIS: ICD-10-CM

## 2023-11-09 DIAGNOSIS — R10.9 BILATERAL FLANK PAIN: ICD-10-CM

## 2023-11-09 DIAGNOSIS — Z87.442 HISTORY OF NEPHROLITHIASIS: Primary | ICD-10-CM

## 2023-11-09 PROCEDURE — 1160F RVW MEDS BY RX/DR IN RCRD: CPT | Mod: CPTII,95,, | Performed by: STUDENT IN AN ORGANIZED HEALTH CARE EDUCATION/TRAINING PROGRAM

## 2023-11-09 PROCEDURE — 1160F PR REVIEW ALL MEDS BY PRESCRIBER/CLIN PHARMACIST DOCUMENTED: ICD-10-PCS | Mod: CPTII,95,, | Performed by: STUDENT IN AN ORGANIZED HEALTH CARE EDUCATION/TRAINING PROGRAM

## 2023-11-09 PROCEDURE — 1159F MED LIST DOCD IN RCRD: CPT | Mod: CPTII,95,, | Performed by: STUDENT IN AN ORGANIZED HEALTH CARE EDUCATION/TRAINING PROGRAM

## 2023-11-09 PROCEDURE — 99214 OFFICE O/P EST MOD 30 MIN: CPT | Mod: 95,,, | Performed by: STUDENT IN AN ORGANIZED HEALTH CARE EDUCATION/TRAINING PROGRAM

## 2023-11-09 PROCEDURE — 1159F PR MEDICATION LIST DOCUMENTED IN MEDICAL RECORD: ICD-10-PCS | Mod: CPTII,95,, | Performed by: STUDENT IN AN ORGANIZED HEALTH CARE EDUCATION/TRAINING PROGRAM

## 2023-11-09 PROCEDURE — 3044F HG A1C LEVEL LT 7.0%: CPT | Mod: CPTII,95,, | Performed by: STUDENT IN AN ORGANIZED HEALTH CARE EDUCATION/TRAINING PROGRAM

## 2023-11-09 PROCEDURE — 76770 US EXAM ABDO BACK WALL COMP: CPT | Mod: 26,,, | Performed by: RADIOLOGY

## 2023-11-09 PROCEDURE — 76770 US EXAM ABDO BACK WALL COMP: CPT | Mod: TC,PO

## 2023-11-09 PROCEDURE — 99214 PR OFFICE/OUTPT VISIT, EST, LEVL IV, 30-39 MIN: ICD-10-PCS | Mod: 95,,, | Performed by: STUDENT IN AN ORGANIZED HEALTH CARE EDUCATION/TRAINING PROGRAM

## 2023-11-09 PROCEDURE — 3044F PR MOST RECENT HEMOGLOBIN A1C LEVEL <7.0%: ICD-10-PCS | Mod: CPTII,95,, | Performed by: STUDENT IN AN ORGANIZED HEALTH CARE EDUCATION/TRAINING PROGRAM

## 2023-11-09 PROCEDURE — 76770 US RETROPERITONEAL COMPLETE: ICD-10-PCS | Mod: 26,,, | Performed by: RADIOLOGY

## 2023-11-09 NOTE — PROGRESS NOTES
Assessment and Plan:    Diagnoses and all orders for this visit:    History of nephrolithiasis  -     US Retroperitoneal Complete; Future    Acute cystitis with hematuria  -     US Retroperitoneal Complete; Future    Bilateral flank pain  -     US Retroperitoneal Complete; Future    Urinary frequency  -     US Retroperitoneal Complete; Future    Pt prefers to hold off on starting Augmentin until retroperitoneal US results available or if symptoms worsen. Advised pt to notify clinic or go to nearest ED for further eval/treatment if symptoms worsen or fail to improve during outpatient workup. Pt agreeable to plan, expressed understanding, all questions answered.     Follow up if symptoms worsen or fail to improve.    Lesly Mustafa MD  11/09/2023     Audiovisual Telehealth Visit:     The patient location is: Home  The chief complaint leading to consultation is: (documented below in HPI)  Visit type: Virtual visit with audiovisual  Total time spent on this encounter: 20 minutes.This includes face to face time and non-face to face time preparing to see the patient (eg, review of tests), obtaining and/or reviewing separately obtained history, documenting clinical information in the electronic or other health record, independently interpreting results, and communicating results to the patient/family/caregiver, or care coordinator.       Each patient to whom I provide medical services by telemedicine is: (1) informed of the relationship between the physician and patient and the respective role of any other health care provider with respect to management of the patient; and (2) notified that they may decline to receive medical services by telemedicine and may withdraw from such care at any time. Patient verbally consented to receive this service via audiovisual call.    Patient ID: Yokasta Pratt is a 43 y.o. female     HPI: 43 y.o. female with a PMHx as documented below presents to clinic today (via  audiovisual telehealth visit) for the following:    Pt reports 2 week history of urinary frequency - dropped off urine sample to OBGYN, +blood and bacteria. Started on Tx w/ cipro. Urine Cx results: 10-50,000 beta-hemolytic strep. OBGYN recommended Augmentin to complete treatment, but pt requested repeat UA prior to switching Abx. Repeat US wnl. No repeat Cx performed. Pt did not  prescription of Augmentin. Since then, pt notes persistent CVA tenderness with gradually worsening urinary frequency. No fever, chills, pelvic pain, N/V, dysuria, hematuria. Of note, pt w/ Hx of nephrolithiasis.     Past Medical History:   Diagnosis Date    Anxiety     Carpal tunnel syndrome on right 07/19/2022    s/p surgery    Chronic maxillary sinusitis 4/20/2023    Chronic tonsillitis 06/09/2014    Dichorionic diamniotic twin pregnancy 11/28/2017    Endometriosis     Gastroesophageal reflux disease 5/9/2023    Greater trochanteric bursitis 09/12/2014    Hypertension affecting pregnancy 01/12/2018    Infertility, female     Mitral valve prolapse 10/31/2018    Baxter's neuroma of left foot 11/15/2016    Baxter's neuroma of second interspace of right foot 12/01/2020    Palpitations 10/31/2018    PCOS (polycystic ovarian syndrome)     PONV (postoperative nausea and vomiting)     Pre-eclampsia 02/05/2018    PVC's (premature ventricular contractions)     2022    Thyromegaly 10/31/2018     Review of Systems   Musculoskeletal:  Positive for back pain.     Answers submitted by the patient for this visit:  Back Pain Questionnaire (Submitted on 11/8/2023)  Chief Complaint: Back pain  Chronicity: new  Onset: 1 to 4 weeks ago  Frequency: daily  Progression since onset: unchanged  Pain location: lumbar spine, costovertebral angle  Pain quality: aching, cramping  Radiates to: does not radiate  Pain - numeric: 3/10  Pain is: the same all the time  Aggravated by: position  Stiffness is present: in the morning  Risk factors: history of renal  stones  Pain severity: mild  Improvement on treatment: no relief    Physical Exam:  Constitutional:       General: No acute distress.  HENT:      Head: Normocephalic and atraumatic.   Pulmonary:      Effort: Pulmonary effort is normal. No respiratory distress.   Neurological:      General: No focal deficit present.      Mental Status: Alert and oriented to person, place, and time. Mental status is at baseline.     Assessment and Plan:   See above    Lesly Mustafa MD  [facility]  Office: [phone]   Fax: [phone]  11/09/2023       Disclaimer: This note was partly generated using dictation software which may occasionally result in transcription errors.

## 2024-02-07 DIAGNOSIS — Z12.31 OTHER SCREENING MAMMOGRAM: ICD-10-CM

## 2024-03-05 DIAGNOSIS — K21.9 GASTROESOPHAGEAL REFLUX DISEASE, UNSPECIFIED WHETHER ESOPHAGITIS PRESENT: ICD-10-CM

## 2024-03-05 RX ORDER — HYDROGEN PEROXIDE 3 %
20 SOLUTION, NON-ORAL MISCELLANEOUS
Qty: 90 CAPSULE | Refills: 2 | Status: SHIPPED | OUTPATIENT
Start: 2024-03-05

## 2024-03-05 NOTE — TELEPHONE ENCOUNTER
No care due was identified.  Health [facility] Embedded Care Due Messages. Reference number: [id number].   3/05/2024 12:16:53 AM CST

## 2024-03-05 NOTE — TELEPHONE ENCOUNTER
Refill Decision Note   Yokasta Pratt  is requesting a refill authorization.  Brief Assessment and Rationale for Refill:  Approve     Medication Therapy Plan:         Alert overridden per protocol: Yes   Comments:     Note composed:6:14 AM 03/05/2024

## 2024-03-15 ENCOUNTER — HOSPITAL ENCOUNTER (OUTPATIENT)
Dept: RADIOLOGY | Facility: HOSPITAL | Age: 44
Discharge: HOME | End: 2024-03-15
Attending: STUDENT IN AN ORGANIZED HEALTH CARE EDUCATION/TRAINING PROGRAM
Payer: COMMERCIAL

## 2024-03-15 DIAGNOSIS — Z12.31 OTHER SCREENING MAMMOGRAM: ICD-10-CM

## 2024-03-15 PROCEDURE — 77067 SCR MAMMO BI INCL CAD: CPT | Mod: TC,PO

## 2024-03-15 PROCEDURE — 77063 BREAST TOMOSYNTHESIS BI: CPT | Mod: 26,,, | Performed by: RADIOLOGY

## 2024-03-15 PROCEDURE — 77067 SCR MAMMO BI INCL CAD: CPT | Mod: 26,,, | Performed by: RADIOLOGY

## 2024-07-17 ENCOUNTER — OFFICE VISIT (OUTPATIENT)
Dept: CARDIOLOGY | Facility: CLINIC | Age: 44
End: 2024-07-17
Payer: COMMERCIAL

## 2024-07-17 VITALS — WEIGHT: 226.63 LBS | HEIGHT: 69 IN | BODY MASS INDEX: 33.57 KG/M2

## 2024-07-17 DIAGNOSIS — I49.3 PVC'S (PREMATURE VENTRICULAR CONTRACTIONS): ICD-10-CM

## 2024-07-17 DIAGNOSIS — R07.9 CHEST PAIN, UNSPECIFIED TYPE: ICD-10-CM

## 2024-07-17 DIAGNOSIS — F41.1 GAD (GENERALIZED ANXIETY DISORDER): Primary | Chronic | ICD-10-CM

## 2024-07-17 PROCEDURE — 99999 PR PBB SHADOW E&M-EST. PATIENT-LVL III: CPT | Mod: PBBFAC,,, | Performed by: INTERNAL MEDICINE

## 2024-07-17 PROCEDURE — 1159F MED LIST DOCD IN RCRD: CPT | Mod: CPTII,S$GLB,, | Performed by: INTERNAL MEDICINE

## 2024-07-17 PROCEDURE — 99214 OFFICE O/P EST MOD 30 MIN: CPT | Mod: S$GLB,,, | Performed by: INTERNAL MEDICINE

## 2024-07-17 PROCEDURE — 93010 ELECTROCARDIOGRAM REPORT: CPT | Mod: S$GLB,,, | Performed by: INTERNAL MEDICINE

## 2024-07-17 PROCEDURE — 93005 ELECTROCARDIOGRAM TRACING: CPT | Mod: PO

## 2024-07-17 PROCEDURE — 3008F BODY MASS INDEX DOCD: CPT | Mod: CPTII,S$GLB,, | Performed by: INTERNAL MEDICINE

## 2024-07-17 PROCEDURE — 1160F RVW MEDS BY RX/DR IN RCRD: CPT | Mod: CPTII,S$GLB,, | Performed by: INTERNAL MEDICINE

## 2024-07-17 NOTE — PROGRESS NOTES
Subjective:    Patient ID:  Yokasta Pratt is a 44 y.o. female patient here for evaluation Follow-up (Chest pain 2) and Palpitations      History of Present Illness:  Follow-up visit.  Atypical chest pain.  Prior history of non complex monomorphic PVCs, left bundle-branch morphology.  Seen by EP, 8% burden.  Conservative medical management recommended invasive normal LV size and function    Complains of left upper quadrant chest discomfort.  Random.  Not related to exertional activity.  No associated symptomatology.  Overall atypical in description    Repeat baseline EKG normal             Review of patient's allergies indicates:   Allergen Reactions    Codeine Other (See Comments)     Makes patient hyper and talks gibberish       Past Medical History:   Diagnosis Date    Anxiety     Carpal tunnel syndrome on right 2022    s/p surgery    Chronic maxillary sinusitis 2023    Chronic tonsillitis 2014    Dichorionic diamniotic twin pregnancy 2017    Endometriosis     Gastroesophageal reflux disease 2023    Greater trochanteric bursitis 2014    Hypertension affecting pregnancy 2018    Infertility, female     Mitral valve prolapse 10/31/2018    Baxter's neuroma of left foot 11/15/2016    Baxter's neuroma of second interspace of right foot 2020    Palpitations 10/31/2018    PCOS (polycystic ovarian syndrome)     PONV (postoperative nausea and vomiting)     Pre-eclampsia 2018    PVC's (premature ventricular contractions)         Thyromegaly 10/31/2018     Past Surgical History:   Procedure Laterality Date    CARPAL TUNNEL RELEASE Right 2022    Procedure: Right carpal tunnel release;  Surgeon: Junior Kaiser MD;  Location: [facility];  Service: Orthopedics;  Laterality: Right;     SECTION      CHOLECYSTECTOMY      approx     DILATION AND CURETTAGE OF UTERUS      SURGICAL REMOVAL OF ENDOMETRIOSIS      laparoscopy    TONSILLECTOMY      PENELOPE  TOOTH EXTRACTION       Social History     Tobacco Use    Smoking status: Former     Current packs/day: 0.00     Average packs/day: 0.3 packs/day for 4.0 years (1.0 ttl pk-yrs)     Types: Cigarettes     Start date: 12/1/2010     Quit date: 6/9/2014     Years since quitting: 10.1    Smokeless tobacco: Never    Tobacco comments:     Quit in 2014   Substance Use Topics    Alcohol use: Yes     Alcohol/week: 5.0 standard drinks of alcohol     Types: 5 Drinks containing 0.5 oz of alcohol per week     Comment: weekends    Drug use: No        Review of Systems:    As noted in HPI in addition      REVIEW OF SYSTEMS  Review of Systems   Constitutional: Negative for decreased appetite, diaphoresis, night sweats, weight gain and weight loss.   HENT:  Negative for nosebleeds and odynophagia.    Eyes:  Negative for double vision and photophobia.   Cardiovascular:  Positive for chest pain. Negative for claudication, cyanosis, dyspnea on exertion, irregular heartbeat, leg swelling, near-syncope, orthopnea, palpitations, paroxysmal nocturnal dyspnea and syncope.   Respiratory:  Negative for cough, hemoptysis, shortness of breath and wheezing.    Hematologic/Lymphatic: Negative for adenopathy.   Skin:  Negative for flushing, skin cancer and suspicious lesions.   Musculoskeletal:  Negative for gout, myalgias and neck pain.   Gastrointestinal:  Negative for abdominal pain, heartburn, hematemesis and hematochezia.   Genitourinary:  Negative for bladder incontinence, hesitancy and nocturia.   Neurological:  Negative for focal weakness, headaches, light-headedness and paresthesias.   Psychiatric/Behavioral:  Negative for memory loss and substance abuse.               Objective:        There were no vitals filed for this visit.    Lab Results   Component Value Date    WBC 6.94 04/27/2023    HGB 15.1 04/27/2023    HCT 47.5 04/27/2023     04/27/2023    CHOL 203 (H) 04/27/2023    TRIG 84 04/27/2023    HDL 47 04/27/2023    ALT 27  04/27/2023    AST 22 04/27/2023     04/27/2023    K 4.6 04/27/2023     04/27/2023    CREATININE 0.9 04/27/2023    BUN 15 04/27/2023    CO2 26 04/27/2023    TSH 1.247 04/27/2023    HGBA1C 5.1 04/27/2023        ECHOCARDIOGRAM RESULTS  Results for orders placed in visit on 01/26/22    Echo    Interpretation Summary  · Concentric remodeling and normal systolic function.  · The estimated ejection fraction is 55%.  · Normal left ventricular diastolic function.  · Normal right ventricular size with normal right ventricular systolic function.  · Mild to moderate tricuspid regurgitation.  · Normal central venous pressure (3 mmHg).  · The estimated PA systolic pressure is 11 mmHg.    No results found for this or any previous visit.          CURRENT/PREVIOUS VISIT EKG  Results for orders placed or performed during the hospital encounter of 03/14/22   EKG 12-lead    Collection Time: 03/14/22  9:01 PM    Narrative    Test Reason : R07.9,    Vent. Rate : 141 BPM     Atrial Rate : 141 BPM     P-R Int : 140 ms          QRS Dur : 078 ms      QT Int : 272 ms       P-R-T Axes : 043 027 045 degrees     QTc Int : 416 ms    Sinus tachycardia  Otherwise normal ECG  When compared with ECG of 05-JAN-2022 11:05,  T wave amplitude has decreased in Anterior-lateral leads  Confirmed by Lizbet MENDOZA, New REYNOLDS ([id number]) on 3/15/2022 8:35:24 AM    Referred By: [email or web address]   SELF           Confirmed By:New Somers MD     No valid procedures specified.   No results found for this or any previous visit.    No valid procedures specified.    PHYSICAL EXAM  GENERAL: well built, well nourished, well-developed in no apparent distress alert and oriented.   HEENT: Normocephalic. Pupils normal and conjunctivae normal.  Mucous membranes normal, no cyanosis or icterus, trachea central,no pallor or icterus is noted..   NECK: No JVD. No bruit..   THYROID: Thyroid not enlarged. No nodules present..   CARDIAC:  Normal S1-S2.  No murmur rub click or  gallop.  PMI nondisplaced.  CHEST ANATOMY: normal.   LUNGS: Clear to auscultation. No wheezing or rhonchi..   ABDOMEN: Soft no masses or organomegaly.  No abdomen pulsations or bruits.  Normal bowel sounds. No pulsations and no masses felt, No guarding or rebound.   URINARY: No miranda catheter   EXTREMITIES: No cyanosis, clubbing or edema noted at this time., no calf tenderness bilaterally.   PERIPHERAL VASCULAR SYSTEM: Good palpable distal pulses.  2+ femoral, popliteal and pedal pulses.  No bruits    CENTRAL NERVOUS SYSTEM: No focal motor or sensory deficits noted.   SKIN: Skin without lesions, moist, well perfused.   MUSCLE STRENGTH & TONE: No noteable weakness, atrophy or abnormal movement    I HAVE REVIEWED :    The vital signs, nurses notes, and all the pertinent radiology and labs.         Current Outpatient Medications   Medication Instructions    azithromycin (Z-CHIOMA) 250 MG tablet TAKE 2 TABLETS BY MOUTH TODAY, THEN TAKE 1 TABLET DAILY FOR 4 DAYS    cetirizine (ZYRTEC) 10 mg, Oral, Daily    DULoxetine (CYMBALTA) 20 mg, Oral, Nightly    DULoxetine (CYMBALTA) 30 mg, Oral    esomeprazole (NEXIUM) 20 mg, Oral, Before breakfast    ranitidine (ZANTAC) 150 mg, Oral, Daily          Assessment:   Atypical chest pain    Cardiac exam review systems otherwise within normal limits.  Blood pressure heart rate stable.    PVCs, symptomatology and frequency of since resolved since last visit.  Conservative medical management suggested in the past.        Plan:     Clinical observation for now.  Call if worsen.    Follow up 4m        No follow-ups on file.

## 2024-07-19 LAB
OHS QRS DURATION: 74 MS
OHS QTC CALCULATION: 442 MS

## 2024-07-29 ENCOUNTER — OFFICE VISIT (OUTPATIENT)
Dept: FAMILY MEDICINE | Facility: CLINIC | Age: 44
End: 2024-07-29
Payer: COMMERCIAL

## 2024-07-29 VITALS
OXYGEN SATURATION: 98 % | DIASTOLIC BLOOD PRESSURE: 72 MMHG | BODY MASS INDEX: 33.48 KG/M2 | SYSTOLIC BLOOD PRESSURE: 120 MMHG | WEIGHT: 226.75 LBS | HEART RATE: 91 BPM | RESPIRATION RATE: 18 BRPM

## 2024-07-29 DIAGNOSIS — Z00.00 PREVENTATIVE HEALTH CARE: Primary | ICD-10-CM

## 2024-07-29 DIAGNOSIS — Z13.1 ENCOUNTER FOR SCREENING FOR DIABETES MELLITUS: ICD-10-CM

## 2024-07-29 DIAGNOSIS — Z13.220 ENCOUNTER FOR SCREENING FOR LIPID DISORDER: ICD-10-CM

## 2024-07-29 DIAGNOSIS — E28.2 PCOS (POLYCYSTIC OVARIAN SYNDROME): Chronic | ICD-10-CM

## 2024-07-29 DIAGNOSIS — N94.0 MITTELSCHMERZ: ICD-10-CM

## 2024-07-29 DIAGNOSIS — N94.10 DYSPAREUNIA, FEMALE: ICD-10-CM

## 2024-07-29 DIAGNOSIS — F41.1 GAD (GENERALIZED ANXIETY DISORDER): Chronic | ICD-10-CM

## 2024-07-29 DIAGNOSIS — N64.4 BREAST TENDERNESS IN FEMALE: ICD-10-CM

## 2024-07-29 PROCEDURE — 3074F SYST BP LT 130 MM HG: CPT | Mod: CPTII,S$GLB,, | Performed by: STUDENT IN AN ORGANIZED HEALTH CARE EDUCATION/TRAINING PROGRAM

## 2024-07-29 PROCEDURE — 3078F DIAST BP <80 MM HG: CPT | Mod: CPTII,S$GLB,, | Performed by: STUDENT IN AN ORGANIZED HEALTH CARE EDUCATION/TRAINING PROGRAM

## 2024-07-29 PROCEDURE — 99396 PREV VISIT EST AGE 40-64: CPT | Mod: S$GLB,,, | Performed by: STUDENT IN AN ORGANIZED HEALTH CARE EDUCATION/TRAINING PROGRAM

## 2024-07-29 PROCEDURE — 3008F BODY MASS INDEX DOCD: CPT | Mod: CPTII,S$GLB,, | Performed by: STUDENT IN AN ORGANIZED HEALTH CARE EDUCATION/TRAINING PROGRAM

## 2024-07-29 PROCEDURE — 1159F MED LIST DOCD IN RCRD: CPT | Mod: CPTII,S$GLB,, | Performed by: STUDENT IN AN ORGANIZED HEALTH CARE EDUCATION/TRAINING PROGRAM

## 2024-07-29 PROCEDURE — 1160F RVW MEDS BY RX/DR IN RCRD: CPT | Mod: CPTII,S$GLB,, | Performed by: STUDENT IN AN ORGANIZED HEALTH CARE EDUCATION/TRAINING PROGRAM

## 2024-07-29 PROCEDURE — 99999 PR PBB SHADOW E&M-EST. PATIENT-LVL III: CPT | Mod: PBBFAC,,, | Performed by: STUDENT IN AN ORGANIZED HEALTH CARE EDUCATION/TRAINING PROGRAM

## 2024-07-29 PROCEDURE — 99214 OFFICE O/P EST MOD 30 MIN: CPT | Mod: 25,S$GLB,, | Performed by: STUDENT IN AN ORGANIZED HEALTH CARE EDUCATION/TRAINING PROGRAM

## 2024-07-29 RX ORDER — SPIRONOLACTONE 100 MG/1
100 TABLET, FILM COATED ORAL DAILY
COMMUNITY
Start: 2024-06-01

## 2024-07-29 RX ORDER — DULOXETIN HYDROCHLORIDE 60 MG/1
60 CAPSULE, DELAYED RELEASE ORAL DAILY
Qty: 30 CAPSULE | Refills: 11 | Status: SHIPPED | OUTPATIENT
Start: 2024-07-29 | End: 2025-07-29

## 2024-07-29 RX ORDER — METFORMIN HYDROCHLORIDE 500 MG/1
500 TABLET, EXTENDED RELEASE ORAL
Qty: 30 TABLET | Refills: 11 | Status: SHIPPED | OUTPATIENT
Start: 2024-07-29 | End: 2025-07-29

## 2024-07-29 NOTE — PROGRESS NOTES
Plan:      Yokasta was seen today for annual exam.    Diagnoses and all orders for this visit:    Preventative health care: Discussed age appropriate preventative healthcare items such as cancer screenings and recommended immunizations. Discussed whether patient is using tobacco, alcohol, or illicit drugs and any concerns were discussed. Discussed maintenance of a healthy weight. Patient queried if she has any additional questions about preventative healthcare and all questions were answered.  -     Hemoglobin A1C; Future  -     Lipid Panel; Future  -     Comprehensive Metabolic Panel; Future  -     CBC Auto Differential; Future    Encounter for screening for diabetes mellitus  -     Hemoglobin A1C; Future  -     Comprehensive Metabolic Panel; Future    Encounter for screening for lipid disorder  -     Lipid Panel; Future    EDNA (generalized anxiety disorder)  -     DULoxetine (CYMBALTA) 60 MG capsule; Take 1 capsule (60 mg total) by mouth once daily.  -     TSH; Future    Breast tenderness in female  Mittelschmerz  Dyspareunia, female  Pt plans to follow-up with OBGYN.     PCOS (polycystic ovarian syndrome)  -     FOLLICLE STIMULATING HORMONE; Future  -     ESTROGENS, TOTAL; Future  -     TESTOSTERONE; Future  -     metFORMIN (GLUCOPHAGE-XR) 500 MG ER 24hr tablet; Take 1 tablet (500 mg total) by mouth daily with breakfast.      Follow up in about 4 weeks (around 8/26/2024), or if symptoms worsen or fail to improve.    Lesly Mustafa MD  07/29/2024    Subjective:      Patient ID: Yokasta Pratt is a 44 y.o. female    Chief Complaint   Patient presents with    Annual Exam     HPI  44 y.o. female with a PMHx as documented below presents to clinic today for the following:    Recent stressors include the passing of her grandmother, upcoming school year, and her 's planned neurosurgery in the next month. Interested in increasing dose of Cymbalta (reports being stable on Cymbalta for 10+  years).    Pt also reports several month history of severe pain with (suspected) ovulation, pain w/ intercourse, and intermittent breast tenderness.  ___________________________________________    Headaches:   - Headaches usually bilateral, frontal headaches  - Associated w/ bilateral sinus pressure (tender to palpation), possibly related to stress (twin 6yo) and muscle tension (neck)  - Pt reports taking ibuprofen most days in addition to daily Zyrtec (does not use Flonase), although pt reports sometimes going a week at a time without NSAIDs     PCOS:   - Spironolactone 100 mg daily   - Previoux Rx: Ozempic 0.5 mg subQ weekly (stopped 2/2 GI side effects), testosterone replacement  - Follows with and treatment managed by OBGYN     EDNA:   - Cymbalta, 30 mg every morning and 20 mg every evening    Review of Systems   HENT:  Negative for hearing loss.    Eyes:  Negative for discharge.   Respiratory:  Negative for wheezing.    Cardiovascular:  Negative for chest pain and palpitations.   Gastrointestinal:  Negative for blood in stool, constipation, diarrhea and vomiting.   Genitourinary:  Negative for dysuria and hematuria.   Musculoskeletal:  Negative for neck pain.   Neurological:  Positive for headaches. Negative for weakness.   Endo/Heme/Allergies:  Negative for polydipsia.     Answers submitted by the patient for this visit:  Review of Systems Questionnaire (Submitted on 7/22/2024)  activity change: No  unexpected weight change: No  rhinorrhea: No  trouble swallowing: No  visual disturbance: No  chest tightness: No  polyuria: No  difficulty urinating: No  menstrual problem: Yes  joint swelling: No  arthralgias: No  confusion: No  dysphoric mood: Yes    Current Outpatient Medications   Medication Instructions    cetirizine (ZYRTEC) 10 mg, Oral, Daily    DULoxetine (CYMBALTA) 20 mg, Oral, Nightly    DULoxetine (CYMBALTA) 30 mg, Oral    DULoxetine (CYMBALTA) 60 mg, Oral, Daily    esomeprazole (NEXIUM) 20 mg, Oral,  Before breakfast    metFORMIN (GLUCOPHAGE-XR) 500 mg, Oral, With breakfast    spironolactone (ALDACTONE) 100 mg, Oral, Daily      Past Medical History:   Diagnosis Date    Anxiety     Carpal tunnel syndrome on right 07/19/2022    s/p surgery    Chronic maxillary sinusitis 4/20/2023    Chronic tonsillitis 06/09/2014    Dichorionic diamniotic twin pregnancy 11/28/2017    Endometriosis     Gastroesophageal reflux disease 5/9/2023    Greater trochanteric bursitis 09/12/2014    Hypertension affecting pregnancy 01/12/2018    Infertility, female     Mitral valve prolapse 10/31/2018    Baxter's neuroma of left foot 11/15/2016    Baxter's neuroma of second interspace of right foot 12/01/2020    Palpitations 10/31/2018    PCOS (polycystic ovarian syndrome)     PONV (postoperative nausea and vomiting)     Pre-eclampsia 02/05/2018    PVC's (premature ventricular contractions)     2022    Thyromegaly 10/31/2018      Objective:      Vitals:    07/29/24 1501 07/29/24 1528   BP: (!) 132/98 120/72   BP Location: Left arm    Patient Position: Sitting    Pulse: 91    Resp: 18    SpO2: 98%    Weight: 102.9 kg (226 lb 11.9 oz)      Body mass index is 33.48 kg/m².    Physical Exam   Constitutional:       General: No acute distress.  HENT:      Head: Normocephalic and atraumatic.   Pulmonary:      Effort: Pulmonary effort is normal. No respiratory distress.   Neurological:      General: No focal deficit present.      Mental Status: Alert and oriented to person, place, and time. Mental status is at baseline.    Assessment:       1. Preventative health care    2. Encounter for screening for diabetes mellitus    3. Encounter for screening for lipid disorder    4. EDNA (generalized anxiety disorder)    5. Breast tenderness in female    6. Mittelschmerz    7. Dyspareunia, female    8. PCOS (polycystic ovarian syndrome)        Lesly Mustafa MD  [facility]  Office: [phone]   Fax: [phone]  07/29/2024      Disclaimer: This note was partly generated using dictation software which may occasionally result in transcription errors.    Total time spent on this encounter includes face to face time and non-face to face time preparing to see the patient (eg, review of tests), obtaining and/or reviewing separately obtained history, documenting clinical information in the electronic or other health record, independently interpreting results, and communicating results to the patient/family/caregiver, or care coordinator.

## 2024-08-01 ENCOUNTER — LAB VISIT (OUTPATIENT)
Dept: LAB | Facility: HOSPITAL | Age: 44
End: 2024-08-01
Attending: STUDENT IN AN ORGANIZED HEALTH CARE EDUCATION/TRAINING PROGRAM
Payer: COMMERCIAL

## 2024-08-01 DIAGNOSIS — Z00.00 PREVENTATIVE HEALTH CARE: ICD-10-CM

## 2024-08-01 DIAGNOSIS — Z13.1 ENCOUNTER FOR SCREENING FOR DIABETES MELLITUS: ICD-10-CM

## 2024-08-01 DIAGNOSIS — E28.2 PCOS (POLYCYSTIC OVARIAN SYNDROME): Chronic | ICD-10-CM

## 2024-08-01 DIAGNOSIS — Z13.220 ENCOUNTER FOR SCREENING FOR LIPID DISORDER: ICD-10-CM

## 2024-08-01 DIAGNOSIS — F41.1 GAD (GENERALIZED ANXIETY DISORDER): Chronic | ICD-10-CM

## 2024-08-01 LAB
ALBUMIN SERPL BCP-MCNC: 4 G/DL (ref 3.5–5.2)
ALP SERPL-CCNC: 70 U/L (ref 55–135)
ALT SERPL W/O P-5'-P-CCNC: 20 U/L (ref 10–44)
ANION GAP SERPL CALC-SCNC: 9 MMOL/L (ref 8–16)
AST SERPL-CCNC: 22 U/L (ref 10–40)
BASOPHILS # BLD AUTO: 0.03 K/UL (ref 0–0.2)
BASOPHILS NFR BLD: 0.4 % (ref 0–1.9)
BILIRUB SERPL-MCNC: 0.9 MG/DL (ref 0.1–1)
BUN SERPL-MCNC: 13 MG/DL (ref 6–20)
CALCIUM SERPL-MCNC: 9.4 MG/DL (ref 8.7–10.5)
CHLORIDE SERPL-SCNC: 103 MMOL/L (ref 95–110)
CHOLEST SERPL-MCNC: 222 MG/DL (ref 120–199)
CHOLEST/HDLC SERPL: 4.8 {RATIO} (ref 2–5)
CO2 SERPL-SCNC: 25 MMOL/L (ref 23–29)
CREAT SERPL-MCNC: 0.9 MG/DL (ref 0.5–1.4)
DIFFERENTIAL METHOD BLD: NORMAL
EOSINOPHIL # BLD AUTO: 0.2 K/UL (ref 0–0.5)
EOSINOPHIL NFR BLD: 1.9 % (ref 0–8)
ERYTHROCYTE [DISTWIDTH] IN BLOOD BY AUTOMATED COUNT: 13.2 % (ref 11.5–14.5)
EST. GFR  (NO RACE VARIABLE): >60 ML/MIN/1.73 M^2
ESTIMATED AVG GLUCOSE: 103 MG/DL (ref 68–131)
FSH SERPL-ACNC: 7.83 MIU/ML
GLUCOSE SERPL-MCNC: 102 MG/DL (ref 70–110)
HBA1C MFR BLD: 5.2 % (ref 4–5.6)
HCT VFR BLD AUTO: 43.9 % (ref 37–48.5)
HDLC SERPL-MCNC: 46 MG/DL (ref 40–75)
HDLC SERPL: 20.7 % (ref 20–50)
HGB BLD-MCNC: 14.3 G/DL (ref 12–16)
IMM GRANULOCYTES # BLD AUTO: 0.01 K/UL (ref 0–0.04)
IMM GRANULOCYTES NFR BLD AUTO: 0.1 % (ref 0–0.5)
LDLC SERPL CALC-MCNC: 154.4 MG/DL (ref 63–159)
LYMPHOCYTES # BLD AUTO: 2.1 K/UL (ref 1–4.8)
LYMPHOCYTES NFR BLD: 26.2 % (ref 18–48)
MCH RBC QN AUTO: 28.6 PG (ref 27–31)
MCHC RBC AUTO-ENTMCNC: 32.6 G/DL (ref 32–36)
MCV RBC AUTO: 88 FL (ref 82–98)
MONOCYTES # BLD AUTO: 0.5 K/UL (ref 0.3–1)
MONOCYTES NFR BLD: 6.8 % (ref 4–15)
NEUTROPHILS # BLD AUTO: 5.1 K/UL (ref 1.8–7.7)
NEUTROPHILS NFR BLD: 64.6 % (ref 38–73)
NONHDLC SERPL-MCNC: 176 MG/DL
NRBC BLD-RTO: 0 /100 WBC
PLATELET # BLD AUTO: 265 K/UL (ref 150–450)
PMV BLD AUTO: 10.9 FL (ref 9.2–12.9)
POTASSIUM SERPL-SCNC: 4.1 MMOL/L (ref 3.5–5.1)
PROT SERPL-MCNC: 6.9 G/DL (ref 6–8.4)
RBC # BLD AUTO: 5 M/UL (ref 4–5.4)
SODIUM SERPL-SCNC: 137 MMOL/L (ref 136–145)
TESTOST SERPL-MCNC: 12 NG/DL (ref 5–73)
TRIGL SERPL-MCNC: 108 MG/DL (ref 30–150)
TSH SERPL DL<=0.005 MIU/L-ACNC: 1.6 UIU/ML (ref 0.4–4)
WBC # BLD AUTO: 7.93 K/UL (ref 3.9–12.7)

## 2024-08-01 PROCEDURE — 83001 ASSAY OF GONADOTROPIN (FSH): CPT | Performed by: STUDENT IN AN ORGANIZED HEALTH CARE EDUCATION/TRAINING PROGRAM

## 2024-08-01 PROCEDURE — 84443 ASSAY THYROID STIM HORMONE: CPT | Performed by: STUDENT IN AN ORGANIZED HEALTH CARE EDUCATION/TRAINING PROGRAM

## 2024-08-01 PROCEDURE — 84403 ASSAY OF TOTAL TESTOSTERONE: CPT | Performed by: STUDENT IN AN ORGANIZED HEALTH CARE EDUCATION/TRAINING PROGRAM

## 2024-08-01 PROCEDURE — 82671 ASSAY OF ESTROGENS: CPT | Performed by: STUDENT IN AN ORGANIZED HEALTH CARE EDUCATION/TRAINING PROGRAM

## 2024-08-01 PROCEDURE — 80061 LIPID PANEL: CPT | Performed by: STUDENT IN AN ORGANIZED HEALTH CARE EDUCATION/TRAINING PROGRAM

## 2024-08-01 PROCEDURE — 80053 COMPREHEN METABOLIC PANEL: CPT | Performed by: STUDENT IN AN ORGANIZED HEALTH CARE EDUCATION/TRAINING PROGRAM

## 2024-08-01 PROCEDURE — 85025 COMPLETE CBC W/AUTO DIFF WBC: CPT | Performed by: STUDENT IN AN ORGANIZED HEALTH CARE EDUCATION/TRAINING PROGRAM

## 2024-08-01 PROCEDURE — 83036 HEMOGLOBIN GLYCOSYLATED A1C: CPT | Performed by: STUDENT IN AN ORGANIZED HEALTH CARE EDUCATION/TRAINING PROGRAM

## 2024-08-08 LAB
ESTRADIOL SERPL HS-MCNC: 42 PG/ML
ESTROGEN SERPL CALC-MCNC: 70 PG/ML
ESTRONE SERPL-MCNC: 28 PG/ML

## 2024-09-16 ENCOUNTER — OFFICE VISIT (OUTPATIENT)
Dept: OTOLARYNGOLOGY | Facility: CLINIC | Age: 44
End: 2024-09-16
Payer: COMMERCIAL

## 2024-09-16 ENCOUNTER — HOSPITAL ENCOUNTER (OUTPATIENT)
Dept: RADIOLOGY | Facility: HOSPITAL | Age: 44
Discharge: HOME | End: 2024-09-16
Attending: NURSE PRACTITIONER
Payer: COMMERCIAL

## 2024-09-16 VITALS
WEIGHT: 227.06 LBS | SYSTOLIC BLOOD PRESSURE: 145 MMHG | BODY MASS INDEX: 33.63 KG/M2 | DIASTOLIC BLOOD PRESSURE: 90 MMHG | HEIGHT: 69 IN

## 2024-09-16 DIAGNOSIS — R51.9 SINUS HEADACHE: ICD-10-CM

## 2024-09-16 DIAGNOSIS — R51.9 SINUS HEADACHE: Primary | ICD-10-CM

## 2024-09-16 PROCEDURE — 99999 PR PBB SHADOW E&M-EST. PATIENT-LVL IV: CPT | Mod: PBBFAC,,, | Performed by: NURSE PRACTITIONER

## 2024-09-16 PROCEDURE — 3077F SYST BP >= 140 MM HG: CPT | Mod: CPTII,S$GLB,, | Performed by: NURSE PRACTITIONER

## 2024-09-16 PROCEDURE — 1159F MED LIST DOCD IN RCRD: CPT | Mod: CPTII,S$GLB,, | Performed by: NURSE PRACTITIONER

## 2024-09-16 PROCEDURE — 70220 X-RAY EXAM OF SINUSES: CPT | Mod: 26,,, | Performed by: RADIOLOGY

## 2024-09-16 PROCEDURE — 1160F RVW MEDS BY RX/DR IN RCRD: CPT | Mod: CPTII,S$GLB,, | Performed by: NURSE PRACTITIONER

## 2024-09-16 PROCEDURE — 99214 OFFICE O/P EST MOD 30 MIN: CPT | Mod: 25,S$GLB,, | Performed by: NURSE PRACTITIONER

## 2024-09-16 PROCEDURE — 3080F DIAST BP >= 90 MM HG: CPT | Mod: CPTII,S$GLB,, | Performed by: NURSE PRACTITIONER

## 2024-09-16 PROCEDURE — 3044F HG A1C LEVEL LT 7.0%: CPT | Mod: CPTII,S$GLB,, | Performed by: NURSE PRACTITIONER

## 2024-09-16 PROCEDURE — 3008F BODY MASS INDEX DOCD: CPT | Mod: CPTII,S$GLB,, | Performed by: NURSE PRACTITIONER

## 2024-09-16 PROCEDURE — 70220 X-RAY EXAM OF SINUSES: CPT | Mod: TC,FY,PO

## 2024-09-16 PROCEDURE — 31231 NASAL ENDOSCOPY DX: CPT | Mod: S$GLB,,, | Performed by: NURSE PRACTITIONER

## 2024-09-16 RX ORDER — METHYLPREDNISOLONE 4 MG/1
TABLET ORAL
Qty: 21 TABLET | Refills: 0 | Status: SHIPPED | OUTPATIENT
Start: 2024-09-16

## 2024-09-16 NOTE — PROGRESS NOTES
"Subjective     Patient ID: Yokasta Pratt is a 44 y.o. female.    Chief Complaint: Sinusitis and Headache (Pt states headaches started on Friday and has been horrible and takes about 12 pills a day to break the pain and have had nights where she was soaking wet of sweating)    HPI  Patient had tonsillectomy by Dr. Whittington in 2014. Patient saw me last year for recurrent sinusitis: CT revealed no air-fluid levels or aerated secretions.   Patient returns today for suspected sinusitis. Patient has been self-treating with Neti pot, Mucinex and a Zpak she had at home.  Patient reports URTI symptoms started 12 days ago. Green mucus in beginning, then resolved. No mucus now, but "incredible," "debilitating" headaches and pressure for the past 3 days. Wakes up drenched/diaphoretic. Taking ibuprofen.       Review of Systems   Constitutional: Negative.  Negative for fever.   HENT:  Positive for postnasal drip and voice change. Negative for nasal congestion, dental problem, facial swelling, rhinorrhea, sinus pressure/congestion, sneezing, sore throat and trouble swallowing.         Globus sensation (sensation of thick or too much mucus in the back of her throat)   Eyes: Negative.  Negative for discharge, redness and itching.   Respiratory: Negative.  Negative for cough and choking.    Cardiovascular: Negative.    Gastrointestinal: Negative.    Musculoskeletal: Negative.    Integumentary:  Negative.   Neurological:  Positive for headaches.   Hematological: Negative.    Psychiatric/Behavioral: Negative.            Objective     Physical Exam  Vitals and nursing note reviewed.   Constitutional:       General: She is not in acute distress.     Appearance: She is well-developed. She is not ill-appearing or diaphoretic.   HENT:      Head: Normocephalic and atraumatic.      Right Ear: Hearing, tympanic membrane, ear canal and external ear normal. No middle ear effusion. Tympanic membrane is not erythematous.      Left Ear: " Hearing, tympanic membrane, ear canal and external ear normal.  No middle ear effusion. Tympanic membrane is not erythematous.      Nose: Nose normal. No septal deviation, mucosal edema, congestion or rhinorrhea.      Right Sinus: No maxillary sinus tenderness or frontal sinus tenderness.      Left Sinus: No maxillary sinus tenderness or frontal sinus tenderness.      Mouth/Throat:      Mouth: Mucous membranes are not pale, not dry and not cyanotic. No oral lesions.      Tongue: No lesions.      Palate: No lesions.      Pharynx: Uvula midline. No oropharyngeal exudate or posterior oropharyngeal erythema.      Tonsils: 0 on the right. 0 on the left.   Eyes:      General: Lids are normal. No scleral icterus.        Right eye: No discharge.         Left eye: No discharge.   Neck:      Thyroid: No thyroid mass or thyromegaly.      Trachea: Trachea normal. No tracheal deviation.   Cardiovascular:      Rate and Rhythm: Normal rate.   Pulmonary:      Effort: Pulmonary effort is normal. No respiratory distress.      Breath sounds: Normal air entry.   Musculoskeletal:         General: Normal range of motion.      Cervical back: Normal range of motion and neck supple.   Lymphadenopathy:      Head:      Right side of head: No submental, submandibular, tonsillar, preauricular or posterior auricular adenopathy.      Left side of head: No submental, submandibular, tonsillar, preauricular or posterior auricular adenopathy.      Cervical: No cervical adenopathy.      Right cervical: No superficial or posterior cervical adenopathy.     Left cervical: No superficial or posterior cervical adenopathy.   Skin:     General: Skin is warm and dry.      Coloration: Skin is not pale.      Findings: No lesion or rash.   Neurological:      Mental Status: She is alert and oriented to person, place, and time.      Motor: Motor function is intact.      Coordination: Coordination is intact.      Gait: Gait normal.   Psychiatric:         Attention  and Perception: Attention normal.         Mood and Affect: Mood normal.         Speech: Speech normal.         Behavior: Behavior normal. Behavior is cooperative.     SEPARATE PROCEDURE NOTE:  Anterior rhinoscopy insufficient to account for patient's symptoms. After verbal consent obtained, bilaterally nasal cavities sprayed with phenylephrine and xylocaine.  A complete diagnostic nasal endoscopy was performed to visualize the nasal cavities, the middle and superior meatus, the turbinates, and the sphenoethmoid recess bilaterally.  Flexible fiberoptic nasoendoscopy carried out and findings were: no mucopus, patent, clean, no polyps, no adenoidal hypertrophy.        Assessment and Plan     Problem List Items Addressed This Visit    None  Visit Diagnoses       Sinus headache    -  Primary    Relevant Medications    methylPREDNISolone (MEDROL DOSEPACK) 4 mg tablet    Other Relevant Orders    X-Ray Sinuses Min 3 Views    Ambulatory referral/consult to Neurology          Sinus x-rays -- will notify pt of results as soon as available. Nasoendoscopy today negative for evidence of sinusitis.   MDP  Referral to Headache Dept if imaging negative for sinusitis etiology.   Patient encouraged to return to clinic if symptoms worsen/persist and as needed for further ENT symptoms or concerns.

## 2024-09-16 NOTE — PATIENT INSTRUCTIONS
"ENT SINUS REGIMEN:  "ENT-APPROVED" NASAL SPRAYS:  Flonase / fluticasone / Nasacort / Rhinocort (steroid spray) is best for stuffy, pressure, fullness. Available over-the-counter without a prescription.   Astepro / azelastine (antihistamine spray) is best for itchy, drippy, sneezy. Available over-the-counter without a prescription.   Atrovent / ipratropium is best for chronic watery nasal drip ("leaky faucet" nose), which may worsen with eating.    Use as directed, spraying 1-2 times in each nostril each day. It may take 2-3 days to 2-3 weeks to begin seeing improvement. This medication needs to be taken consistently to see results. Overall, this is a well-tolerated medication with low side effects. The benefit of nasal steroids as opposed to oral steroids is that the nasal steroid spray works primarily in the nose. Common side effects can include: headache, nasal dryness, minor nose bleed.  Rare side effects may include:  septal perforation, elevation in eye pressure, dry eyes, change in smell, allergic reaction.  Notify your provider if you have any concerns or experience these symptoms.     Nasal spray instructions:  Blow nose first gently to clean. Keep chin level with the floor (do not tilt head forward or back). Using the opposite hand (example: right hand for left nostril, left hand for right nostril) insert nasal spray taking caution to direct it AWAY from the middle wall inside the nose (septum) to avoid irritating nasal septum which could cause nosebleed.  Do not tilt spray up but rather flat and out along the roof of your mouth to spray. Angle the tip of the spray out slightly toward the direction of the ears; then spray. Do not take quick vigorous sniff but rather slow gentle inhalation while waiting for medication to absorb into nasal passages. Then administer second spray in same way.     Nasal saline rinse kit (use Neti pot or EndoEvolution sinus rinse kit) -- Rinse your sinuses once to twice daily to reduce " the allergen burden in your nose. Use sterile water (boil tap water for five minutes before allowing to cool) or distilled bottled water. Add 1/4 teaspoon sea salt and a pinch of baking soda or a mixture packet from the maker of your sinus rinse kit.  Rinse through both sides of nose to cleanse sinus and nasal passages, bending forward with head tilted down. Keep your mouth open and breathe through your open mouth without holding your breath. Squeeze bottle gently until solution starts draining from the opposite nasal passage. After bottle is empty, blow nose very gently, without pinching the nose off completely, to avoid exerting pressure on eardrums.  There are useful YouTube videos that show demonstration of how to do these properly.      Ponaris Nasal Emollient is used for the relief of: nasal congestion due to colds, nasal irritation, allergy exacerbations, nasal crusting. Specifically prepared iodized organic oils of pine, eucalyptus, peppermint, cajeput, and cottonseed. To order Ponaris: ask your pharmacist to order it for you or we carry it in our pharmacy downstairs on the first floor.         HEADACHE TOOLBOX    Migraine Triggers    Migraine may occur in association with particular events, foods, or changes in your body.  These events are no nose triggers.  Although triggers do not actually cause migraine, they can make you more vulnerable to migraine attacks.  Most patients with migraine report that they are attacks are brought on by triggers.    In a survey of 200 patients with migraine, over 90% of patients surveyed identified at least 1 trigger associated with their migraine and 83% reported multiple triggers:  Most commonly emotional stress (59%), too much or too little sleep (54%), odors (47%), and missing meals (39%).  Some important considerations about migraine triggers are:  1.  No single entity (example: red wine, chocolate, stress) acts as a trigger for all migraineurs.  2.  It is rare that in  an individual migraineurs, a single trigger consistently leads to an attack.  3.  Exposure to 2 or more triggers at the same time may be required to bring on an attack.  For example, drinking red wine during menstruation may lead to a migraine attack.  4.  Sometimes, what serves as a trigger may also serve as a treatment for migraine (example: caffeine).    5.  Maintaining regular eating habits -- and avoiding skipping meals -- is more beneficial in controlling migraine than any specific diet or avoidance of food.  A brain susceptible to migraine appears to be extra sensitive to changes in the person's internal or external environment; examples of internal change include the abrupt decline in estrogen levels occurring with menstruation, sudden stress, or a change in one's usual sleep pattern (example: oversleeping on the weekend or vacation).  External changes commonly include weather changes, ingestion of alcohol, and exposure to bright or flickering light.  Following exposure to unknown trigger, the genetically primed brain response by initiating a number of events that are expressed as migraine headache, often associated by nausea and sensitivity to light and sound.  Other theories propose that certain areas of the brain may be involved in migraine.  These areas may become activated when a person has not had enough sleep or has not eaten in a while, as well as other areas that are involved in regulating stress.  Identifying your triggers may help prevent migraine attacks.  Some potential triggers are included below:  FOODS:   Alcohol, especially red wine  Caffeine/caffeine withdrawal  Can not fix   Chicken livers  Chocolate   Citrus fruits   Fish, especially smoked  MSG  Nitrates   Nuts  Tickled hearing   Prolonged lack of food   Skipping meals altogether   Some carbonated beverages (aspartame)   Some dairy products (yogurt, aged cheese)  HORMONES:  Menopause  Menstruation or ovulation    Pregnancy  PHYSICAL/EMOTIONAL:  Anger  Bending or stooping over (gardening)  Changes in sleep patterns   Depression   Emotional let down   Acceleration  Fatigue   Head injury   High blood pressure   Intense physical activity  Lifting heavy weights, straining  Stress   Toothache, or other head or neck pain   Travel  ENVIRONMENTAL:  Bright sunlight or glare of any kind  Changes in weather   Exposure to flickering light   High altitudes   High humidity   Intense odors or chemical smells  Prolonged focusing on screens   Tobacco smoke  MEDICATIONS:  Birth control pills  Sleeping aids

## 2024-09-19 ENCOUNTER — OFFICE VISIT (OUTPATIENT)
Dept: FAMILY MEDICINE | Facility: CLINIC | Age: 44
End: 2024-09-19
Payer: COMMERCIAL

## 2024-09-19 DIAGNOSIS — G43.901 STATUS MIGRAINOSUS: Primary | ICD-10-CM

## 2024-09-19 DIAGNOSIS — G43.019 INTRACTABLE MIGRAINE WITHOUT AURA AND WITHOUT STATUS MIGRAINOSUS: ICD-10-CM

## 2024-09-19 RX ORDER — SUMATRIPTAN 50 MG/1
TABLET, FILM COATED ORAL
Qty: 1 TABLET | Refills: 0 | Status: SHIPPED | OUTPATIENT
Start: 2024-09-19

## 2024-09-19 RX ORDER — KETOROLAC TROMETHAMINE 10 MG/1
TABLET, FILM COATED ORAL
Qty: 2 TABLET | Refills: 0 | Status: SHIPPED | OUTPATIENT
Start: 2024-09-19

## 2024-09-19 RX ORDER — DIPHENHYDRAMINE HCL 25 MG
CAPSULE ORAL
Qty: 1 CAPSULE | Refills: 0 | Status: SHIPPED | OUTPATIENT
Start: 2024-09-19

## 2024-09-19 RX ORDER — SUMATRIPTAN 50 MG/1
TABLET, FILM COATED ORAL
Qty: 9 TABLET | Refills: 11 | Status: SHIPPED | OUTPATIENT
Start: 2024-09-20

## 2024-09-19 RX ORDER — PROCHLORPERAZINE MALEATE 10 MG
TABLET ORAL
Qty: 1 TABLET | Refills: 0 | Status: SHIPPED | OUTPATIENT
Start: 2024-09-19

## 2024-09-19 NOTE — PROGRESS NOTES
Assessment and Plan:    Diagnoses and all orders for this visit:    Status migrainosus  -     ketorolac (TORADOL) 10 mg tablet; Take 2 tab (20 mg) with additional Rx: Imitrex 50 mg, Compazine 10 mg, and Benadryl 25 mg  -     prochlorperazine (COMPAZINE) 10 MG tablet; Take 1 tab (10 mg) with additional Rx: Imitrex 50 mg, Benadryl 25 mg, and Toradol 20 mg  -     sumatriptan (IMITREX) 50 MG tablet; Take 1 tab (50 mg) with additional Rx: Compazine 10 mg, Benadryl 25 mg, and Toradol 20 mg  -     diphenhydrAMINE (BENADRYL) 25 mg capsule; Take 1 tab (25 mg) with additional Rx: Compazine 10 mg, Toradol 20 mg, and Imitrex 50 mg    Intractable migraine without aura and without status migrainosus  -     sumatriptan (IMITREX) 50 MG tablet; Take 1 tablet (50 mg) at first sign of headache, okay to repeat dose in 2 hours if symptoms persist. Do not exceed 2 doses (100 mg) in 24 hour period.    F/u Neurology recs - upcoming appt on Wednesday of next week.     Follow up in about 4 weeks (around 10/17/2024), or if symptoms worsen or fail to improve.    Lesly Mustafa MD  09/19/2024     Audiovisual Telehealth Visit:     The patient location is: Home  The chief complaint leading to consultation is: (documented below in HPI)  Visit type: Virtual visit with audiovisual  Total time spent on this encounter: 20 minutes.This includes face to face time and non-face to face time preparing to see the patient (eg, review of tests), obtaining and/or reviewing separately obtained history, documenting clinical information in the electronic or other health record, independently interpreting results, and communicating results to the patient/family/caregiver, or care coordinator.       Each patient to whom I provide medical services by telemedicine is: (1) informed of the relationship between the physician and patient and the respective role of any other health care provider with respect to management of the patient; and (2) notified that they may  decline to receive medical services by telemedicine and may withdraw from such care at any time. Patient verbally consented to receive this service via audiovisual call.    Patient ID: Yokasta Pratt is a 44 y.o. female     HPI: 44 y.o. female with a PMHx as documented below presents to clinic today (via audiovisual telehealth visit) for the following:    Pt reports persistent frontal/left temporal headache for about 1 week following suspected sinus infection the week prior. S/p ENT appt on 9/16/24 - prescribed Medrol dose pack (little relief). Recent optometry appointment with no abnormalities noted. She has been talking OTC treatments with mild relief.    Neurology appointment on Wednesday of next week.   ________________________________________________________    Headaches:   - Headaches usually bilateral, frontal headaches  - Associated w/ bilateral sinus pressure (tender to palpation), possibly related to stress (twin 4yo) and muscle tension (neck)  - Pt reports taking ibuprofen most days in addition to daily Zyrtec (does not use Flonase), although pt reports sometimes going a week at a time without NSAIDs     PCOS:   - Spironolactone 100 mg daily   - Metformin  mg daily  - Previoux Rx: Ozempic 0.5 mg subQ weekly (stopped 2/2 GI side effects), testosterone replacement  - Follows with and treatment managed by OBGYN     EDNA:   - Cymbalta 60 mg daily    Past Medical History:   Diagnosis Date    Anxiety     Carpal tunnel syndrome on right 07/19/2022    s/p surgery    Chronic maxillary sinusitis 4/20/2023    Chronic tonsillitis 06/09/2014    Dichorionic diamniotic twin pregnancy 11/28/2017    Endometriosis     Gastroesophageal reflux disease 5/9/2023    Greater trochanteric bursitis 09/12/2014    Hypertension affecting pregnancy 01/12/2018    Infertility, female     Mitral valve prolapse 10/31/2018    Baxter's neuroma of left foot 11/15/2016    Baxter's neuroma of second interspace of right foot  12/01/2020    Palpitations 10/31/2018    PCOS (polycystic ovarian syndrome)     PONV (postoperative nausea and vomiting)     Pre-eclampsia 02/05/2018    PVC's (premature ventricular contractions)     2022    Thyromegaly 10/31/2018     Review of Systems   HENT:  Negative for hearing loss.    Eyes:  Negative for discharge.   Respiratory:  Negative for wheezing.    Cardiovascular:  Negative for chest pain and palpitations.   Gastrointestinal:  Negative for blood in stool, constipation, diarrhea and vomiting.   Genitourinary:  Negative for dysuria and hematuria.   Musculoskeletal:  Positive for neck pain.   Neurological:  Positive for headaches. Negative for weakness.   Endo/Heme/Allergies:  Negative for polydipsia.     Answers submitted by the patient for this visit:  Review of Systems Questionnaire (Submitted on 9/18/2024)  activity change: No  unexpected weight change: No  rhinorrhea: No  trouble swallowing: No  visual disturbance: No  chest tightness: No  polyuria: No  difficulty urinating: No  menstrual problem: No  joint swelling: No  arthralgias: No  confusion: No  dysphoric mood: No    Physical Exam:  Constitutional:       General: No acute distress.  HENT:      Head: Normocephalic and atraumatic.   Pulmonary:      Effort: Pulmonary effort is normal. No respiratory distress.   Neurological:      General: No focal deficit present.      Mental Status: Alert and oriented to person, place, and time. Mental status is at baseline.     Assessment and Plan:   See above    Lesly Mustafa MD  [facility]  Office: [phone]   Fax: [phone]  09/19/2024       Disclaimer: This note was partly generated using dictation software which may occasionally result in transcription errors.

## 2024-09-24 NOTE — PROGRESS NOTES
"New Patient     The patient location is: [place]  The chief complaint leading to consultation is: headache     Visit type: audiovisual    Face to Face time with patient: 60  90 minutes of total time spent on the encounter, which includes face to face time and non-face to face time preparing to see the patient (eg, review of tests), Obtaining and/or reviewing separately obtained history, Documenting clinical information in the electronic or other health record, Independently interpreting results (not separately reported) and communicating results to the patient/family/caregiver, or Care coordination (not separately reported).     Each patient to whom he or she provides medical services by telemedicine is:  (1) informed of the relationship between the physician and patient and the respective role of any other health care provider with respect to management of the patient; and (2) notified that he or she may decline to receive medical services by telemedicine and may withdraw from such care at any time.    Notes:       SUBJECTIVE:  Patient ID: Yokasta Pratt   MRN: [id number]  Referred By: Maude Khalil  Chief Complaint: No chief complaint on file.      History of Present Illness:   44 y.o. female with migraines, HTN, MVP, palpitations/PVCs, thyromegaly, anxiety, CTS s/p surgical repair, gerd, pcos, kidney stone (passed on her own with minimal pain), who presents to virtual visit alone for evaluation of headaches.     Pt has a history of migraines and is referred to me by pcp for management. Pt confirms information gathered during appt with pcp on 9/19, "Pt reports persistent frontal/left temporal headache for about 1 week following suspected sinus infection the week prior. S/p ENT appt on 9/16/24 - prescribed Medrol dose pack (little relief). Recent optometry appointment with no abnormalities noted. She has been talking OTC treatments with mild relief. " Imitrex 50mg, toradol 10mg, compazine 10mg, and " "benadryl 25mg prescribed at this appointment.       Pt also taking Cymbalta 60mg for anxiety.     CT sinuses done by ENT on 8/16 and no problems noted. Sees two separate providers (one is a family friend). Feels that L jaw could be cause of more pain. Clenches jaw.     Optometry appt this week (outside of [facility]) and negative (dilated exam done).    Reports checking BP at home and has low readings 110/68 to 128/78    Advil daily 30 years. Recently started taking tylenol.      had brain surgery recently and husbands father is end-of-life. STRESS in life right now!!    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   Quality - pressure!, throbbing, "head rush" with quick movements  Duration - 1 hour- recently 12day episode  Intensity (range) - 4-8/10  Frequency - 30/30 ha days per month, 2/30 are debilitating. This month 4 days were debilitating  Triggers - skipping meals, stress (twin 6yo,  sx, and father-in-law end-of-life) and muscle tension in neck, alcohol   Aggravating Factors - menstrual cycle/menopausal changes  Alleviating Factors - sleep, dark, quiet room, ice pack  Recent Changes - no  Prodrome/Aura - yes, kaleidescope vision and peripheral vision loss (20min before migraine) in the past - has not had aura recently  HA today? - no  Time of day of most headaches- anytime   Sleep - rare snoring (only with sinus infections), wakes feeling refreshed, resolution of headache with sleep    Associated symptoms with the headache:   Meningeal symptoms - photophobia, phonophobia, exercise intolerance "   Nausea/vomitting  Nasal drainage   Visual blurriness   Pallor/flushing  Dizziness - lightheadedness (more with spironolactone)  Vertigo  Confusion  Impaired concentration   Pain worsened with physical activity   Neck pain - R>L, has done PT, massage, cupping - helps      Cluster headache symptoms:   Swollen or droopy eyelid  Swelling under or around the eye (may affect both eyes)  Excessive tearing  Red eye  Rhinorrhea or one-sided nasal congestion   Red, flushed face   Forehead and facial sweating    Symptoms of increased intracranial pressure:   Whooshing sounds - positional   Visual spots/blotches     Basilar migraine symptoms:  Dysarthria  Vertigo  Tinnitus - once when  Hypacusia  Diplopia  Simultaneous visual symptoms in both temporal and nasal fields of both eyes  Ataxia  Reduced level of consciousness  Bilateral paresthesias      Treatments Tried   Tylenol/ibuprofen dual medication  Ibuprofen   Zyrtec   Benadryl   Toradol   Topamax - Maybe some word-finding issues  Buspar   Compazine  Cymbalta 60mg - taking for anxiety  Ozempic   Flexeril   PT - dry needling    Social History  Alcohol - 3x/week  Smoke - quit 10 years ago  Recreational Drug Use- denies    Current Medications:    Current Outpatient Medications:     cetirizine (ZYRTEC) 10 MG tablet, Take 10 mg by mouth once daily., Disp: , Rfl:     diphenhydrAMINE (BENADRYL) 25 mg capsule, Take 1 tab (25 mg) with additional Rx: Compazine 10 mg, Toradol 20 mg, and Imitrex 50 mg, Disp: 1 capsule, Rfl: 0    DULoxetine (CYMBALTA) 20 MG capsule, Take 20 mg by mouth every evening., Disp: , Rfl:     DULoxetine (CYMBALTA) 30 MG capsule, Take 30 mg by mouth., Disp: , Rfl:     DULoxetine (CYMBALTA) 60 MG capsule, Take 1 capsule (60 mg total) by mouth once daily., Disp: 30 capsule, Rfl: 11    esomeprazole (NEXIUM) 20 MG capsule, TAKE 1 CAPSULE BY MOUTH BEFORE BREAKFAST EVERY DAY, Disp: 90 capsule, Rfl: 2    ketorolac (TORADOL) 10 mg tablet, Take 2 tab (20 mg) with  additional Rx: Imitrex 50 mg, Compazine 10 mg, and Benadryl 25 mg, Disp: 2 tablet, Rfl: 0    metFORMIN (GLUCOPHAGE-XR) 500 MG ER 24hr tablet, Take 1 tablet (500 mg total) by mouth daily with breakfast., Disp: 30 tablet, Rfl: 11    methylPREDNISolone (MEDROL DOSEPACK) 4 mg tablet, Take as directed, Disp: 21 tablet, Rfl: 0    prochlorperazine (COMPAZINE) 10 MG tablet, Take 1 tab (10 mg) with additional Rx: Imitrex 50 mg, Benadryl 25 mg, and Toradol 20 mg, Disp: 1 tablet, Rfl: 0    spironolactone (ALDACTONE) 100 MG tablet, Take 100 mg by mouth once daily., Disp: , Rfl:     sumatriptan (IMITREX) 50 MG tablet, Take 1 tablet (50 mg) at first sign of headache, okay to repeat dose in 2 hours if symptoms persist. Do not exceed 2 doses (100 mg) in 24 hour period., Disp: 9 tablet, Rfl: 11    sumatriptan (IMITREX) 50 MG tablet, Take 1 tab (50 mg) with additional Rx: Compazine 10 mg, Benadryl 25 mg, and Toradol 20 mg, Disp: 1 tablet, Rfl: 0    Review of Systems - as per HPI, otherwise a balanced 10 systems review is negative.    OBJECTIVE:  Vitals:  There were no vitals taken for this visit.    Physical Exam: certain limitations due to video visit platform, able to perform the following with the patient's assistance:  Constitutional: she appears well-developed and well-nourished. she is well groomed. NAD  HENT:    Head: Normocephalic and atraumatic  Musculoskeletal: Normal range of motion.   Psychiatric: Normal mood and affect.     Neuro exam:    Mental status:  The patient is alert and oriented to person, place and time.  Language is intact and fluent  Remote and recent memory are intact  Normal attention and concentration  Mood is stable    Cranial Nerves:  Facial movement is symmetric.   FROM of neck in all (6) directions without pain  Shoulder shrug symmetrical.      Motor:  Normal muscle bulk and symmetry.  Tremor not apparent       Review of Data:   Notes from pcp, ent, cardiology reviewed   Labs:  Lab Visit on 08/01/2024    Component Date Value Ref Range Status    Hemoglobin A1C 08/01/2024 5.2  4.0 - 5.6 % Final    Estimated Avg Glucose 08/01/2024 103  68 - 131 mg/dL Final    Cholesterol 08/01/2024 222 (H)  120 - 199 mg/dL Final    Triglycerides 08/01/2024 108  30 - 150 mg/dL Final    HDL 08/01/2024 46  40 - 75 mg/dL Final    LDL Cholesterol 08/01/2024 154.4  63.0 - 159.0 mg/dL Final    HDL/Cholesterol Ratio 08/01/2024 20.7  20.0 - 50.0 % Final    Total Cholesterol/HDL Ratio 08/01/2024 4.8  2.0 - 5.0 Final    Non-HDL Cholesterol 08/01/2024 176  mg/dL Final    Sodium 08/01/2024 137  136 - 145 mmol/L Final    Potassium 08/01/2024 4.1  3.5 - 5.1 mmol/L Final    Chloride 08/01/2024 103  95 - 110 mmol/L Final    CO2 08/01/2024 25  23 - 29 mmol/L Final    Glucose 08/01/2024 102  70 - 110 mg/dL Final    BUN 08/01/2024 13  6 - 20 mg/dL Final    Creatinine 08/01/2024 0.9  0.5 - 1.4 mg/dL Final    Calcium 08/01/2024 9.4  8.7 - 10.5 mg/dL Final    Total Protein 08/01/2024 6.9  6.0 - 8.4 g/dL Final    Albumin 08/01/2024 4.0  3.5 - 5.2 g/dL Final    Total Bilirubin 08/01/2024 0.9  0.1 - 1.0 mg/dL Final    Alkaline Phosphatase 08/01/2024 70  55 - 135 U/L Final    AST 08/01/2024 22  10 - 40 U/L Final    ALT 08/01/2024 20  10 - 44 U/L Final    eGFR 08/01/2024 >60.0  >60 mL/min/1.73 m^2 Final    Anion Gap 08/01/2024 9  8 - 16 mmol/L Final    WBC 08/01/2024 7.93  3.90 - 12.70 K/uL Final    RBC 08/01/2024 5.00  4.00 - 5.40 M/uL Final    Hemoglobin 08/01/2024 14.3  12.0 - 16.0 g/dL Final    Hematocrit 08/01/2024 43.9  37.0 - 48.5 % Final    MCV 08/01/2024 88  82 - 98 fL Final    MCH 08/01/2024 28.6  27.0 - 31.0 pg Final    MCHC 08/01/2024 32.6  32.0 - 36.0 g/dL Final    RDW 08/01/2024 13.2  11.5 - 14.5 % Final    Platelets 08/01/2024 265  150 - 450 K/uL Final    MPV 08/01/2024 10.9  9.2 - 12.9 fL Final    Immature Granulocytes 08/01/2024 0.1  0.0 - 0.5 % Final    Gran # (ANC) 08/01/2024 5.1  1.8 - 7.7 K/uL Final    Immature Grans (Abs) 08/01/2024 0.01   0.00 - 0.04 K/uL Final    Lymph # 08/01/2024 2.1  1.0 - 4.8 K/uL Final    Mono # 08/01/2024 0.5  0.3 - 1.0 K/uL Final    Eos # 08/01/2024 0.2  0.0 - 0.5 K/uL Final    Baso # 08/01/2024 0.03  0.00 - 0.20 K/uL Final    nRBC 08/01/2024 0  0 /100 WBC Final    Gran % 08/01/2024 64.6  38.0 - 73.0 % Final    Lymph % 08/01/2024 26.2  18.0 - 48.0 % Final    Mono % 08/01/2024 6.8  4.0 - 15.0 % Final    Eosinophil % 08/01/2024 1.9  0.0 - 8.0 % Final    Basophil % 08/01/2024 0.4  0.0 - 1.9 % Final    Differential Method 08/01/2024 Automated   Final    TSH 08/01/2024 1.603  0.400 - 4.000 uIU/mL Final    Follicle Stimulating Hormone 08/01/2024 7.83  See Text mIU/mL Final    Estrone (Esoterix) 08/01/2024 28  pg/mL Final    Estradiol 08/01/2024 42  pg/mL Final    Estrogen 08/01/2024 70  pg/mL Final    Testosterone, Total 08/01/2024 12  5 - 73 ng/dL Final   Office Visit on 07/17/2024   Component Date Value Ref Range Status    QRS Duration 07/17/2024 74  ms Final    [facility] QTC Calculation 07/17/2024 442  ms Final     Imaging:  No results found for this or any previous visit.  Note: I have independently reviewed any/all imaging/labs/tests and agree with the report (s) as documented.  Any discrepancies will be as noted/demarcated by free text.  JOSEPHINE PHAN 9/24/2024    ASSESSMENT:  No diagnosis found.      PLAN:  - Discussed symptoms appear to be consistent with migraine with aura c/b rebound headache and cervicogenic headache. Discussed this with patient along with treatment options and patient agreed with the following plan  - Symptoms not consistent with a unifying disorder, DDx includes: IIH    - rescue: imitrex Increase to 100mg. Max dose 200mg/day.   - prevention: start Emgality. Can consider botox in the future as well. Can also consider alternative to Cymbalta in the future such as Venlafaxine or Amitriptyline   - Discussed limiting all over-the-counter medications to less than 15x/month to avoid rebound headaches. Medrol dose  pack.   - cervicalgia - refer to PT  - Discussed supplements: Mg, Riboflavin, CoQ10  - lightheadedness - f/u w/ obgyn re: spironolactone as patient feels this is contributing factor.  - jaw and neck pain - refer to physical therapy and recommend mouth guard at night with dentist      - risks, benefits, and potential side effects of zonisamide discussed   - alternative treatment options offered   - importance of healthy diet, regular exercise and sleep hygiene in the treatment of headaches    - Start tracking headaches via Migraine Buddy azeem on phone   - RTC in 3 months. May be virtual.           I have discussed realistic goals of care with patient at length as well as medication options, and need for lifestyle adjustment. I have explained that treatment will take time. We have agreed that the goal will be to reduce frequency/intensity/quantity of HA, not to be completely HA free. I have explained my non narcotic policy regarding headache treatment.    Patient agreeable to work on lifestyle adjustments.    Discussed potential for teratogenicity with treatment, patient understands if her family planning status should change she will contact office immediately and we will safely adjust medications as needed.     Questions and concerns were sought and answered to the patient's stated verbal satisfaction.  The patient verbalizes understanding and agreement with the above stated treatment plan.     CC: Lesly Mustafa MD Monique Smith, FNP-C  [facility]  [phone]    Dr. Rowe was available during today's encounter.

## 2024-09-25 ENCOUNTER — PATIENT MESSAGE (OUTPATIENT)
Dept: ADMINISTRATIVE | Facility: HOSPITAL | Age: 44
End: 2024-09-25
Payer: COMMERCIAL

## 2024-09-25 ENCOUNTER — OFFICE VISIT (OUTPATIENT)
Dept: NEUROLOGY | Facility: CLINIC | Age: 44
End: 2024-09-25
Payer: COMMERCIAL

## 2024-09-25 DIAGNOSIS — R51.9 SINUS HEADACHE: ICD-10-CM

## 2024-09-25 DIAGNOSIS — G43.109 MIGRAINE WITH AURA AND WITHOUT STATUS MIGRAINOSUS, NOT INTRACTABLE: Primary | ICD-10-CM

## 2024-09-25 DIAGNOSIS — R42 ORTHOSTATIC LIGHTHEADEDNESS: ICD-10-CM

## 2024-09-25 DIAGNOSIS — M54.2 CERVICALGIA: ICD-10-CM

## 2024-09-25 DIAGNOSIS — G44.40 REBOUND HEADACHE: ICD-10-CM

## 2024-09-25 DIAGNOSIS — G44.86 CERVICOGENIC HEADACHE: ICD-10-CM

## 2024-09-25 PROCEDURE — 3044F HG A1C LEVEL LT 7.0%: CPT | Mod: CPTII,95,,

## 2024-09-25 PROCEDURE — 99417 PROLNG OP E/M EACH 15 MIN: CPT | Mod: 95,,,

## 2024-09-25 PROCEDURE — 99205 OFFICE O/P NEW HI 60 MIN: CPT | Mod: 95,,,

## 2024-09-25 RX ORDER — SUMATRIPTAN SUCCINATE 100 MG/1
100 TABLET ORAL 2 TIMES DAILY PRN
Qty: 10 TABLET | Refills: 3 | Status: SHIPPED | OUTPATIENT
Start: 2024-09-25 | End: 2024-10-25

## 2024-09-25 RX ORDER — METHYLPREDNISOLONE 4 MG/1
TABLET ORAL
Qty: 21 EACH | Refills: 0 | Status: SHIPPED | OUTPATIENT
Start: 2024-09-25 | End: 2024-10-16

## 2024-09-25 NOTE — PATIENT INSTRUCTIONS
Supplements for Migraine:  Magnesium Oxide 400 mg daily (take at nighttime)   Vitamin B2 (Riboflavin) - 400 mg daily   Co-Q10 200 mg daily

## 2024-10-11 ENCOUNTER — PATIENT MESSAGE (OUTPATIENT)
Dept: FAMILY MEDICINE | Facility: CLINIC | Age: 44
End: 2024-10-11
Payer: COMMERCIAL

## 2024-10-11 ENCOUNTER — E-VISIT (OUTPATIENT)
Dept: PRIMARY CARE CLINIC | Facility: CLINIC | Age: 44
End: 2024-10-11
Payer: COMMERCIAL

## 2024-10-11 DIAGNOSIS — N20.0 NEPHROLITHIASIS: Primary | ICD-10-CM

## 2024-10-11 RX ORDER — TAMSULOSIN HYDROCHLORIDE 0.4 MG/1
0.4 CAPSULE ORAL DAILY
Qty: 14 CAPSULE | Refills: 0 | Status: SHIPPED | OUTPATIENT
Start: 2024-10-11 | End: 2024-10-25

## 2024-10-11 NOTE — PROGRESS NOTES
@Patient ID: Yokasta Pratt is a 44 y.o. female.    Chief Complaint: Urinary Tract Infection (Entered automatically based on patient selection in [employer].)    The patient initiated a request through [employer] on 10/11/2024 for evaluation and management with a chief complaint of Urinary Tract Infection (Entered automatically based on patient selection in [employer].)     I evaluated the questionnaire submission on 10/11/24.    [facility] P[facility] Evisit Uti Questionnaire    10/11/2024  2:31 PM CDT - Filed by Patient   Do you agree to participate in an E-Visit? Yes   If you have any of the following symptoms, please present to your local emergency room or call 911:  I acknowledge   Choose the state of your primary residence [place]   Are you pregnant, could you be pregnant, or are you breast feeding? None of the above   What is the main issue you would like addressed today? Kidney stone   What symptoms do you currently have? Pain while passing urine;  Difficulty passing urine   When did your symptoms first appear? 10/10/2024   List what you have done or taken to help your symptoms. Took advil and tylenol   Please indicate whether you have had the following symptoms during the past 24 hours     Urgent urination (a sudden and uncontrollable urge to urinate) None   Frequent urination of small amounts of urine (going to the toilet very often) Moderate   Burning pain when urinating Mild   Incomplete bladder emptying (still feel like you need to urinate again after urination) None   Pain not associated with urination in the lower abdomen below the belly button) Moderate   What does your urine look like? Clear   Blood seen in the urine Mild   Flank pain (pain in one or both sides of the lower back) Moderate   Abnormal Vaginal Discharge (abnormal amount, color and/or odor) None   Discharge from the urethra (urinary opening) without urination None   High body temperature/fever? None-<99.5   Please rate how much discomfort you have  experience because of the symptoms in the past 24 hours: Moderate   Please indicate how the symptoms have interfered with your every day activities/work in the past 24 hours: Moderate   Please indicate how these symptoms have interfered with your social activities (visiting people, meeting with friends, etc.) in the past 24 hours? Moderate   Are you a diabetic? No   Please indicate whether you have the following at the time of completion of this questionnaire: None of the above   Provide any additional information you feel is important.    Please attach any relevant images or files (if you have performed a home test for UTI, please submit a photo of results)    Are you able to take your vital signs? Yes   Systolic Blood Pressure: 128   Diastolic Blood Pressure: 87   Weight: 220   Height: 69   Pulse: 99   Temperature:    Respiration rate:    Pulse Oxygen:          Encounter Diagnosis   Name Primary?    Nephrolithiasis Yes        No orders of the defined types were placed in this encounter.     Medications Ordered This Encounter   Medications    tamsulosin (FLOMAX) 0.4 mg Cap     Sig: Take 1 capsule (0.4 mg total) by mouth once daily. for 14 days     Dispense:  14 capsule     Refill:  0        No follow-ups on file.      E-Visit Time Tracking:    Day 1 Time (in minutes): 11    Total Time (in minutes): 11

## 2024-10-14 ENCOUNTER — OFFICE VISIT (OUTPATIENT)
Dept: UROLOGY | Facility: CLINIC | Age: 44
End: 2024-10-14
Payer: COMMERCIAL

## 2024-10-14 VITALS — HEIGHT: 69 IN | WEIGHT: 225.31 LBS | BODY MASS INDEX: 33.37 KG/M2

## 2024-10-14 DIAGNOSIS — N20.0 KIDNEY STONES: Primary | ICD-10-CM

## 2024-10-14 LAB
BILIRUBIN, UA POC OHS: NEGATIVE
BLOOD, UA POC OHS: ABNORMAL
CLARITY, UA POC OHS: CLEAR
COLOR, UA POC OHS: YELLOW
GLUCOSE, UA POC OHS: NEGATIVE
KETONES, UA POC OHS: NEGATIVE
LEUKOCYTES, UA POC OHS: NEGATIVE
NITRITE, UA POC OHS: NEGATIVE
PH, UA POC OHS: 6
PROTEIN, UA POC OHS: 100
SPECIFIC GRAVITY, UA POC OHS: >=1.03
UROBILINOGEN, UA POC OHS: 0.2

## 2024-10-14 PROCEDURE — 99999 PR PBB SHADOW E&M-EST. PATIENT-LVL IV: CPT | Mod: PBBFAC,,, | Performed by: STUDENT IN AN ORGANIZED HEALTH CARE EDUCATION/TRAINING PROGRAM

## 2024-10-14 PROCEDURE — 1159F MED LIST DOCD IN RCRD: CPT | Mod: CPTII,S$GLB,, | Performed by: STUDENT IN AN ORGANIZED HEALTH CARE EDUCATION/TRAINING PROGRAM

## 2024-10-14 PROCEDURE — 99204 OFFICE O/P NEW MOD 45 MIN: CPT | Mod: S$GLB,,, | Performed by: STUDENT IN AN ORGANIZED HEALTH CARE EDUCATION/TRAINING PROGRAM

## 2024-10-14 PROCEDURE — 3044F HG A1C LEVEL LT 7.0%: CPT | Mod: CPTII,S$GLB,, | Performed by: STUDENT IN AN ORGANIZED HEALTH CARE EDUCATION/TRAINING PROGRAM

## 2024-10-14 PROCEDURE — 1160F RVW MEDS BY RX/DR IN RCRD: CPT | Mod: CPTII,S$GLB,, | Performed by: STUDENT IN AN ORGANIZED HEALTH CARE EDUCATION/TRAINING PROGRAM

## 2024-10-14 PROCEDURE — 3008F BODY MASS INDEX DOCD: CPT | Mod: CPTII,S$GLB,, | Performed by: STUDENT IN AN ORGANIZED HEALTH CARE EDUCATION/TRAINING PROGRAM

## 2024-10-14 PROCEDURE — 81003 URINALYSIS AUTO W/O SCOPE: CPT | Mod: QW,S$GLB,, | Performed by: STUDENT IN AN ORGANIZED HEALTH CARE EDUCATION/TRAINING PROGRAM

## 2024-10-14 RX ORDER — MONTELUKAST SODIUM 10 MG/1
10 TABLET ORAL NIGHTLY
COMMUNITY
Start: 2024-09-24

## 2024-10-14 RX ORDER — FLUTICASONE PROPIONATE 50 MCG
2 SPRAY, SUSPENSION (ML) NASAL
COMMUNITY
Start: 2024-09-24

## 2024-10-14 RX ORDER — KETOROLAC TROMETHAMINE 10 MG/1
TABLET, FILM COATED ORAL
Qty: 2 TABLET | Refills: 0 | Status: SHIPPED | OUTPATIENT
Start: 2024-10-14

## 2024-10-14 NOTE — PROGRESS NOTES
"[facility] - [facility]   Clinic Note    Subjective:     Chief Complaint: Nephrolithiasis    History of Present Illness:  Yokasta Pratt is a 44 y.o. female who presents to clinic for evaluation and management of kidney stones. She is new to our clinic referred by Dr. Lesly Mustafa    She was recently in the ED with flank pain.  CT from 10/12/2024 showed a 9 mm left upper pole stone in 1.7 cm left renal pelvis stone without hydronephrosis.  There are no stones on the right.  There is no right-sided hydronephrosis.  She was having pain which resolved prior to the CT scan.  She suspects she may have passed a stone.  Previous imaging from 03/2022 also reviewed showing stones in similar position but smaller than most recent imaging.    Past medical, family, surgical and social history reviewed as documented in chart with pertinent positive medical, family, surgical and social history detailed in HPI.    A review systems was conducted with pertinent positive and negative findings documented in HPI.    Objective:     Estimated body mass index is 33.27 kg/m² as calculated from the following:    Height as of this encounter: 5' 9" (1.753 m).    Weight as of this encounter: 102.2 kg (225 lb 5 oz).    Vital Signs (Most Recent)       Physical Exam  Constitutional:       General: She is not in acute distress.     Appearance: She is well-developed. She is not ill-appearing or toxic-appearing.   Pulmonary:      Effort: Pulmonary effort is normal. No accessory muscle usage or respiratory distress.   Neurological:      Mental Status: She is alert.         Labs reviewed below:  Lab Results   Component Value Date    BUN 17 10/12/2024    CREATININE 0.63 10/12/2024    WBC 10.70 10/12/2024    HGB 13.5 10/12/2024    HCT 39.7 10/12/2024     10/12/2024    AST 32 10/12/2024    ALT 39 (H) 10/12/2024    ALKPHOS 65 10/12/2024    ALBUMIN 4.5 10/12/2024    HGBA1C 5.2 08/01/2024      Urine dipstick today showed moderate blood, " no leukocyte esterase, and negative nitrite.     Assessment:     1. Kidney stones      Plan:     We discussed the medical and surgical management of stones and different approaches depending on the stone size and location. We discussed flomax and its indications. We reviewed shockwave lithotripsy, ureteroscopy with laser lithotripsy, or observation. We discussed the risks and benefits to each approach. We discussed expectations and recovery times, and stone free rates. We reviewed the possibility of needing a ureteral stent and its associated risks. We also discussed the possible need for further procedures regardless of approach.     Given the multifocal stones and skin to stone distance I recommend proceeding with ureteroscopy.    We discussed the procedure, risks, benefits, expectations.      She would prefer to wait until after the holidays.  Follow up in 3 months with an x-ray to schedule left ureteroscopy      Aaron Galvin MD

## 2024-10-21 DIAGNOSIS — K21.9 GASTROESOPHAGEAL REFLUX DISEASE, UNSPECIFIED WHETHER ESOPHAGITIS PRESENT: ICD-10-CM

## 2024-10-21 RX ORDER — HYDROGEN PEROXIDE 3 %
20 SOLUTION, NON-ORAL MISCELLANEOUS
Qty: 90 CAPSULE | Refills: 3 | Status: SHIPPED | OUTPATIENT
Start: 2024-10-21

## 2024-10-21 NOTE — TELEPHONE ENCOUNTER
No care due was identified.  [facility] Embedded Care Due Messages. Reference number: [id number].   10/21/2024 12:17:34 AM CDT

## 2024-10-21 NOTE — TELEPHONE ENCOUNTER
Refill Decision Note   Yokasta Pratt  is requesting a refill authorization.  Brief Assessment and Rationale for Refill:  Approve     Medication Therapy Plan:         Extended chart review required: Yes   Comments:     Note composed:9:56 AM 10/21/2024

## 2024-12-27 NOTE — PROGRESS NOTES
"Established Patient   SUBJECTIVE:  Patient ID: Yokasta Pratt   Chief Complaint: No chief complaint on file.    History of Present Illness:  Yokasta Pratt is a 44 y.o. female who presents for follow-up of headaches via virtual visit.     The patient location is: [place]  The chief complaint leading to consultation is: headache     Visit type: audiovisual    Face to Face time with patient: 25  31 minutes of total time spent on the encounter, which includes face to face time and non-face to face time preparing to see the patient (eg, review of tests), Obtaining and/or reviewing separately obtained history, Documenting clinical information in the electronic or other health record, Independently interpreting results (not separately reported) and communicating results to the patient/family/caregiver, or Care coordination (not separately reported).     Each patient to whom he or she provides medical services by telemedicine is:  (1) informed of the relationship between the physician and patient and the respective role of any other health care provider with respect to management of the patient; and (2) notified that he or she may decline to receive medical services by telemedicine and may withdraw from such care at any time.      12/30/24 - Interval History:  Emgality working working great! From "pain from 100 to 8 now"  Went to ER on 10/12 for kidney stone - saw urology and plans to schedule ureteroscopy after the holidays. Stopped otc meds - Had to take advil once. Only using occasional toradol and tyelnol for other pains. Sumatriptan makes her too drowsy/bad feeling where she doesn't want to drive after taking.   WILDA did pass... stress is better.   Going to PT - helping. Dry needling was good - missed this week d/t the holidays. Forgets HEP but trying to remember.     Recommendations made at last Office Visit on 9/25/24:  - rescue: imitrex Increase to 100mg. Max dose 200mg/day.   - prevention: " "start Emgality. Can consider botox in the future as well. Can also consider alternative to Cymbalta in the future such as Venlafaxine or Amitriptyline   - Discussed limiting all over-the-counter medications to less than 15x/month to avoid rebound headaches. Medrol dose pack.   - cervicalgia - refer to PT  - Discussed supplements: Mg, Riboflavin, CoQ10  - lightheadedness - f/u w/ obgyn re: spironolactone as patient feels this is contributing factor.  - jaw and neck pain - refer to physical therapy and recommend mouth guard at night with dentist    History of Present Illness:   44 y.o. female with migraines, HTN, MVP, palpitations/PVCs, thyromegaly, anxiety, CTS s/p surgical repair, gerd, pcos, kidney stone (passed on her own), who presents to virtual visit alone for evaluation of headaches.      Pt has a history of migraines and is referred to me by pcp for management. Pt confirms information gathered during appt with pcp on 9/19, "Pt reports persistent frontal/left temporal headache for about 1 week following suspected sinus infection the week prior. S/p ENT appt on 9/16/24 - prescribed Medrol dose pack (little relief). Recent optometry appointment with no abnormalities noted. She has been talking OTC treatments with mild relief. " Imitrex 50mg, toradol 10mg, compazine 10mg, and benadryl 25mg prescribed at this appointment.         Pt also taking Cymbalta 60mg for anxiety.      CT sinuses done by ENT on 8/16 and no problems noted. Sees two separate providers (one is a family friend). Feels that L jaw could be cause of more pain. Clenches jaw.      Optometry appt this week (outside of [facility]) and negative (dilated exam done).     Reports checking BP at home and has low readings 110/68 to 128/78     Advil daily 30 years. Recently started taking tylenol.       had brain surgery recently and husbands father is end-of-life. STRESS in life right now!!     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   Quality - pressure!, throbbing, "head rush" with quick movements  Duration - 1 hour- recently 12day episode  Intensity (range) - 4-8/10  Frequency - 30/30 ha days per month, 2/30 are debilitating. This month 4 days were debilitating  Triggers - skipping meals, stress (twin 4yo,  sx, and father-in-law end-of-life) and muscle tension in neck, alcohol   Aggravating Factors - menstrual cycle/menopausal changes  Alleviating Factors - sleep, dark, quiet room, ice pack  Recent Changes - no  Prodrome/Aura - yes, kaleidescope vision and peripheral vision loss (20min before migraine) in the past - has not had aura recently  HA today? - no  Time of day of most headaches- anytime   Sleep - rare snoring (only with sinus infections), wakes feeling refreshed, resolution of headache with sleep     Associated symptoms with the headache:   Meningeal symptoms - photophobia, phonophobia, exercise intolerance   Nausea/vomitting  Nasal drainage   Visual blurriness   Pallor/flushing  Dizziness - lightheadedness (more with spironolactone)  Vertigo  Confusion  Impaired concentration   Pain worsened with physical activity   Neck pain - R>L, has done PT, massage, cupping - helps        Cluster headache symptoms:   Swollen or droopy eyelid  Swelling under or around the eye (may affect both eyes)  Excessive tearing  Red eye  Rhinorrhea or one-sided nasal congestion   Red, flushed face   Forehead and facial sweating     Symptoms of increased intracranial pressure:   Whooshing sounds - positional   Visual spots/blotches      Basilar migraine " symptoms:  Dysarthria  Vertigo  Tinnitus - once when  Hypacusia  Diplopia  Simultaneous visual symptoms in both temporal and nasal fields of both eyes  Ataxia  Reduced level of consciousness  Bilateral paresthesias        Treatments Tried   Tylenol/ibuprofen dual medication  Ibuprofen   Imitrex 100mg  Rizatriptan*  Zyrtec   Benadryl   Toradol   Topamax - Maybe some word-finding issues  Buspar   Compazine  Cymbalta 60mg - taking for anxiety  Ozempic   Flexeril   PT - dry needling    Current Medications:    Current Outpatient Medications:     DULoxetine (CYMBALTA) 60 MG capsule, Take 1 capsule (60 mg total) by mouth once daily., Disp: 30 capsule, Rfl: 11    esomeprazole (NEXIUM) 20 MG capsule, TAKE 1 CAPSULE BY MOUTH BEFORE BREAKFAST EVERY DAY, Disp: 90 capsule, Rfl: 3    fluticasone propionate (FLONASE) 50 mcg/actuation nasal spray, 2 sprays by Each Nostril route., Disp: , Rfl:     galcanezumab-gnlm 120 mg/mL PnIj, Inject 1 mL (120 mg total) into the skin every 28 days - Maintenance dose, Disp: 1 mL, Rfl: 5    ketorolac (TORADOL) 10 mg tablet, Take 2 tab (20 mg) with additional Rx: Imitrex 50 mg, Compazine 10 mg, and Benadryl 25 mg, Disp: 2 tablet, Rfl: 0    metFORMIN (GLUCOPHAGE-XR) 500 MG ER 24hr tablet, Take 1 tablet (500 mg total) by mouth daily with breakfast. (Patient not taking: Reported on 10/14/2024), Disp: 30 tablet, Rfl: 11    montelukast (SINGULAIR) 10 mg tablet, Take 10 mg by mouth every evening., Disp: , Rfl:     spironolactone (ALDACTONE) 100 MG tablet, Take 100 mg by mouth once daily., Disp: , Rfl:     Review of Systems - A review of 10+ systems was conducted with pertinent positive and negative findings documented in HPI with all other systems reviewed and negative.    PFSH: Past medical, family, and social history reviewed as documented in chart with pertinent positive medical, family, and social history detailed in HPI.    OBJECTIVE:  Vitals: There were no vitals taken for this visit.     Physical  Exam:  Constitutional: she appears well-developed and well-nourished. she is well groomed. NAD.    Review of Data:   Notes from ER reviewed   Labs:  Office Visit on 10/14/2024   Component Date Value Ref Range Status    Color, POC UA 10/14/2024 Yellow  Yellow, Straw, Colorless Final    Clarity, POC UA 10/14/2024 Clear  Clear Final    Glucose, POC UA 10/14/2024 Negative  Negative Final    Bilirubin, POC UA 10/14/2024 Negative  Negative Final    Ketones, POC UA 10/14/2024 Negative  Negative Final    Spec Grav POC UA 10/14/2024 >=1.030  1.005 - 1.030 Final    Blood, POC UA 10/14/2024 Moderate (A)  Negative Final    pH, POC UA 10/14/2024 6.0  5.0 - 8.0 Final    Protein, POC UA 10/14/2024 100 (A)  Negative Final    Urobilinogen, POC UA 10/14/2024 0.2  <=1.0 Final    Nitrite, POC UA 10/14/2024 Negative  Negative Final    WBC, POC UA 10/14/2024 Negative  Negative Final   Admission on 10/12/2024, Discharged on 10/12/2024   Component Date Value Ref Range Status    WBC 10/12/2024 10.70  3.90 - 12.70 K/uL Final    RBC 10/12/2024 4.68  4.00 - 5.40 M/uL Final    Hemoglobin 10/12/2024 13.5  12.0 - 16.0 g/dL Final    Hematocrit 10/12/2024 39.7  37.0 - 48.5 % Final    MCV 10/12/2024 85  82 - 98 fL Final    MCH 10/12/2024 28.8  27.0 - 31.0 pg Final    MCHC 10/12/2024 34.0  32.0 - 36.0 g/dL Final    RDW 10/12/2024 12.7  11.5 - 14.5 % Final    Platelets 10/12/2024 256  150 - 450 K/uL Final    MPV 10/12/2024 10.0  9.2 - 12.9 fL Final    Immature Granulocytes 10/12/2024 0.2  0.0 - 0.5 % Final    Gran # (ANC) 10/12/2024 6.5  1.8 - 7.7 K/uL Final    Immature Grans (Abs) 10/12/2024 0.02  0.00 - 0.04 K/uL Final    Lymph # 10/12/2024 3.2  1.0 - 4.8 K/uL Final    Mono # 10/12/2024 0.7  0.3 - 1.0 K/uL Final    Eos # 10/12/2024 0.2  0.0 - 0.5 K/uL Final    Baso # 10/12/2024 0.04  0.00 - 0.20 K/uL Final    nRBC 10/12/2024 0  0 /100 WBC Final    Gran % 10/12/2024 60.6  38.0 - 73.0 % Final    Lymph % 10/12/2024 30.3  18.0 - 48.0 % Final    Mono %  10/12/2024 6.7  4.0 - 15.0 % Final    Eosinophil % 10/12/2024 1.8  0.0 - 8.0 % Final    Basophil % 10/12/2024 0.4  0.0 - 1.9 % Final    Differential Method 10/12/2024 Automated   Final    Sodium 10/12/2024 139  136 - 145 mmol/L Final    Potassium 10/12/2024 3.8  3.5 - 5.1 mmol/L Final    Chloride 10/12/2024 104  95 - 110 mmol/L Final    CO2 10/12/2024 24  22 - 31 mmol/L Final    Glucose 10/12/2024 109  70 - 110 mg/dL Final    BUN 10/12/2024 17  7 - 18 mg/dL Final    Creatinine 10/12/2024 0.63  0.50 - 1.40 mg/dL Final    Calcium 10/12/2024 9.2  8.4 - 10.2 mg/dL Final    Total Protein 10/12/2024 7.3  6.0 - 8.4 g/dL Final    Albumin 10/12/2024 4.5  3.5 - 5.2 g/dL Final    Total Bilirubin 10/12/2024 0.9  0.2 - 1.3 mg/dL Final    Alkaline Phosphatase 10/12/2024 65  38 - 145 U/L Final    AST 10/12/2024 32  14 - 36 U/L Final    ALT 10/12/2024 39 (H)  0 - 35 U/L Final    Anion Gap 10/12/2024 11  5 - 12 mmol/L Final    eGFR 10/12/2024 >60  >60 mL/min/1.73 m^2 Final    Specimen UA 10/12/2024 Urine, Clean Catch   Final    Color, UA 10/12/2024 Yellow  Yellow, Straw, Adrianna Final    Appearance, UA 10/12/2024 Hazy (A)  Clear Final    pH, UA 10/12/2024 5.5  5.0 - 8.0 Final    Specific Gravity, UA 10/12/2024 1.015  1.005 - 1.030 Final    Protein, UA 10/12/2024 2+ (A)  Negative Final    Glucose, UA 10/12/2024 Negative  Negative Final    Ketones, UA 10/12/2024 Negative  Negative Final    Bilirubin (UA) 10/12/2024 Negative  Negative Final    Occult Blood UA 10/12/2024 3+ (A)  Negative Final    Nitrite, UA 10/12/2024 Negative  Negative Final    Urobilinogen, UA 10/12/2024 0.2  <2.0 EU/dL Final    Leukocytes, UA 10/12/2024 Trace (A)  Negative Final    POC Preg Test, Ur 10/12/2024 Negative  Negative Final     Acceptable 10/12/2024 Yes   Final    RBC, UA 10/12/2024 >100 (H)  0 - 4 /hpf Final    WBC, UA 10/12/2024 6 (H)  0 - 5 /hpf Final    Bacteria 10/12/2024 Negative  Negative /hpf Final    Squam Epithel, UA 10/12/2024 6   /hpf Final    Hyaline Casts, UA 10/12/2024 2 (A)  0 - 1 /lpf Final    Microscopic Comment 10/12/2024 SEE COMMENT   Final     Imaging:  No results found for this or any previous visit.  Note: I have independently reviewed any/all imaging/labs/tests and agree with the report (s) as documented.  Any discrepancies will be as noted/demarcated by free text.  SYLVESTER, LIZ-JANN 12/30/2024    ASSESSMENT:  1. Rebound headache    2. Migraine with aura and without status migrainosus, not intractable    3. Cervicalgia        PLAN:  - preventative: continue emgality  - rescue: stop sumatriptan. Start rizatriptan. Will consider ubrelvy/nurtec if still having side effects from rizatriptan.   - cervicalgia - continue PT and HEP  - Continue tracking headaches   - Discussed goals of therapy are to decrease the frequency, intensity, and duration of headaches  - RTC in 1-2 months. Virtual ok.           Discussed potential for teratogenicity with treatment, patient understands if her family planning status should change she will contact office immediately and we will safely adjust medications as needed.     Questions and concerns were sought and answered to the patient's stated verbal satisfaction.  The patient verbalizes understanding and agreement with the above stated treatment plan.     CC: Lesly Mustafa MD Monique Smith, FNP-C  [facility]   [phone]    Dr. Rowe was available during today's encounter.

## 2024-12-30 ENCOUNTER — OFFICE VISIT (OUTPATIENT)
Dept: NEUROLOGY | Facility: CLINIC | Age: 44
End: 2024-12-30
Payer: COMMERCIAL

## 2024-12-30 DIAGNOSIS — M54.2 CERVICALGIA: ICD-10-CM

## 2024-12-30 DIAGNOSIS — G44.40 REBOUND HEADACHE: Primary | ICD-10-CM

## 2024-12-30 DIAGNOSIS — G43.109 MIGRAINE WITH AURA AND WITHOUT STATUS MIGRAINOSUS, NOT INTRACTABLE: ICD-10-CM

## 2024-12-30 PROCEDURE — 1159F MED LIST DOCD IN RCRD: CPT | Mod: CPTII,95,,

## 2024-12-30 PROCEDURE — 1160F RVW MEDS BY RX/DR IN RCRD: CPT | Mod: CPTII,95,,

## 2024-12-30 PROCEDURE — 99214 OFFICE O/P EST MOD 30 MIN: CPT | Mod: 95,,,

## 2024-12-30 PROCEDURE — 3044F HG A1C LEVEL LT 7.0%: CPT | Mod: CPTII,95,,

## 2024-12-30 RX ORDER — RIZATRIPTAN BENZOATE 5 MG/1
5 TABLET ORAL 2 TIMES DAILY PRN
Qty: 10 TABLET | Refills: 9 | Status: SHIPPED | OUTPATIENT
Start: 2024-12-30 | End: 2025-02-18

## 2025-01-16 ENCOUNTER — HOSPITAL ENCOUNTER (OUTPATIENT)
Dept: RADIOLOGY | Facility: HOSPITAL | Age: 45
Discharge: HOME | End: 2025-01-16
Attending: STUDENT IN AN ORGANIZED HEALTH CARE EDUCATION/TRAINING PROGRAM
Payer: COMMERCIAL

## 2025-01-16 ENCOUNTER — PATIENT MESSAGE (OUTPATIENT)
Dept: UROLOGY | Facility: CLINIC | Age: 45
End: 2025-01-16
Payer: COMMERCIAL

## 2025-01-16 DIAGNOSIS — N20.0 KIDNEY STONES: ICD-10-CM

## 2025-01-16 PROCEDURE — 74018 RADEX ABDOMEN 1 VIEW: CPT | Mod: TC,FY,PO

## 2025-01-16 PROCEDURE — 74018 RADEX ABDOMEN 1 VIEW: CPT | Mod: 26,,, | Performed by: STUDENT IN AN ORGANIZED HEALTH CARE EDUCATION/TRAINING PROGRAM

## 2025-01-17 ENCOUNTER — TELEPHONE (OUTPATIENT)
Dept: UROLOGY | Facility: CLINIC | Age: 45
End: 2025-01-17
Payer: COMMERCIAL

## 2025-01-17 DIAGNOSIS — N20.0 KIDNEY STONE: ICD-10-CM

## 2025-01-17 DIAGNOSIS — N20.0 KIDNEY STONES: Primary | ICD-10-CM

## 2025-01-20 ENCOUNTER — PATIENT MESSAGE (OUTPATIENT)
Dept: CARDIOLOGY | Facility: CLINIC | Age: 45
End: 2025-01-20
Payer: COMMERCIAL

## 2025-01-21 DIAGNOSIS — N20.0 KIDNEY STONES: Primary | ICD-10-CM

## 2025-02-03 PROBLEM — N12 PYELONEPHRITIS: Status: ACTIVE | Noted: 2025-02-03

## 2025-02-03 PROBLEM — Z98.890 STATUS POST LASER LITHOTRIPSY OF URETERAL CALCULUS: Status: ACTIVE | Noted: 2025-02-03

## 2025-02-03 PROBLEM — Z71.89 ADVANCE CARE PLANNING: Status: ACTIVE | Noted: 2025-02-03

## 2025-02-03 PROBLEM — R65.10 SIRS (SYSTEMIC INFLAMMATORY RESPONSE SYNDROME): Status: ACTIVE | Noted: 2025-02-03

## 2025-02-13 ENCOUNTER — TELEPHONE (OUTPATIENT)
Dept: UROLOGY | Facility: CLINIC | Age: 45
End: 2025-02-13
Payer: COMMERCIAL

## 2025-02-13 ENCOUNTER — PATIENT MESSAGE (OUTPATIENT)
Dept: UROLOGY | Facility: CLINIC | Age: 45
End: 2025-02-13
Payer: COMMERCIAL

## 2025-02-14 ENCOUNTER — PROCEDURE VISIT (OUTPATIENT)
Dept: UROLOGY | Facility: CLINIC | Age: 45
End: 2025-02-14
Payer: COMMERCIAL

## 2025-02-14 VITALS — HEIGHT: 69 IN | BODY MASS INDEX: 32.43 KG/M2 | WEIGHT: 218.94 LBS

## 2025-02-14 DIAGNOSIS — N20.0 KIDNEY STONES: Primary | ICD-10-CM

## 2025-02-14 LAB
BILIRUBIN, UA POC OHS: NEGATIVE
BLOOD, UA POC OHS: ABNORMAL
CLARITY, UA POC OHS: ABNORMAL
COLOR, UA POC OHS: ABNORMAL
GLUCOSE, UA POC OHS: 100
KETONES, UA POC OHS: ABNORMAL
LEUKOCYTES, UA POC OHS: ABNORMAL
NITRITE, UA POC OHS: POSITIVE
PH, UA POC OHS: 5
PROTEIN, UA POC OHS: >=300
SPECIFIC GRAVITY, UA POC OHS: >=1.03
UROBILINOGEN, UA POC OHS: 1

## 2025-02-14 NOTE — PROCEDURES
CYSTOSCOPY W/ STENT REMOVAL    Date/Time: 2/14/2025 8:30 AM    Performed by: Aaron Galvin MD  Authorized by: Aaron Galvin MD      Procedure:   Flexible cysto-urethroscopy and ureteral stent removal    Pre Procedure Diagnosis:   s/p ureteroscopy and stent placement    Post Procedure Diagnosis:   Same    Surgeon: Aaron Galvin MD     Anesthesia: None    Procedure note:  The risks and benefits were explained and informed consent was obtained. The genitalia was prepped and draped in the sterile fashion.    Flexible cysto-urethroscopy was performed. The flexible scope was advanced into the urethra and into the bladder. The stent was removed without difficulty.    The patient tolerated the procedure well without complication.     She will follow up in 3 months with a KUB and renal US

## 2025-02-18 ENCOUNTER — PATIENT MESSAGE (OUTPATIENT)
Facility: CLINIC | Age: 45
End: 2025-02-18
Payer: COMMERCIAL

## 2025-03-18 ENCOUNTER — HOSPITAL ENCOUNTER (OUTPATIENT)
Dept: RADIOLOGY | Facility: HOSPITAL | Age: 45
Discharge: HOME | End: 2025-03-18
Attending: STUDENT IN AN ORGANIZED HEALTH CARE EDUCATION/TRAINING PROGRAM
Payer: COMMERCIAL

## 2025-03-18 ENCOUNTER — RESULTS FOLLOW-UP (OUTPATIENT)
Dept: FAMILY MEDICINE | Facility: CLINIC | Age: 45
End: 2025-03-18

## 2025-03-18 DIAGNOSIS — Z12.31 ENCOUNTER FOR SCREENING MAMMOGRAM FOR BREAST CANCER: ICD-10-CM

## 2025-03-18 PROCEDURE — 77063 BREAST TOMOSYNTHESIS BI: CPT | Mod: 26,,, | Performed by: RADIOLOGY

## 2025-03-18 PROCEDURE — 77067 SCR MAMMO BI INCL CAD: CPT | Mod: 26,,, | Performed by: RADIOLOGY

## 2025-03-18 PROCEDURE — 77067 SCR MAMMO BI INCL CAD: CPT | Mod: TC,PO

## 2025-05-16 ENCOUNTER — HOSPITAL ENCOUNTER (OUTPATIENT)
Dept: RADIOLOGY | Facility: HOSPITAL | Age: 45
Discharge: HOME | End: 2025-05-16
Attending: STUDENT IN AN ORGANIZED HEALTH CARE EDUCATION/TRAINING PROGRAM
Payer: COMMERCIAL

## 2025-05-16 DIAGNOSIS — N20.0 KIDNEY STONES: ICD-10-CM

## 2025-05-16 PROCEDURE — 76770 US EXAM ABDO BACK WALL COMP: CPT | Mod: TC,PO

## 2025-05-16 PROCEDURE — 74018 RADEX ABDOMEN 1 VIEW: CPT | Mod: TC,FY,PO

## 2025-05-16 PROCEDURE — 74018 RADEX ABDOMEN 1 VIEW: CPT | Mod: 26,,, | Performed by: STUDENT IN AN ORGANIZED HEALTH CARE EDUCATION/TRAINING PROGRAM

## 2025-05-16 PROCEDURE — 76770 US EXAM ABDO BACK WALL COMP: CPT | Mod: 26,,, | Performed by: STUDENT IN AN ORGANIZED HEALTH CARE EDUCATION/TRAINING PROGRAM

## 2025-05-20 ENCOUNTER — OFFICE VISIT (OUTPATIENT)
Dept: UROLOGY | Facility: CLINIC | Age: 45
End: 2025-05-20
Payer: COMMERCIAL

## 2025-05-20 VITALS — WEIGHT: 223.13 LBS | BODY MASS INDEX: 33.05 KG/M2 | HEIGHT: 69 IN

## 2025-05-20 DIAGNOSIS — N20.0 KIDNEY STONES: Primary | ICD-10-CM

## 2025-05-20 PROCEDURE — 3008F BODY MASS INDEX DOCD: CPT | Mod: CPTII,S$GLB,, | Performed by: STUDENT IN AN ORGANIZED HEALTH CARE EDUCATION/TRAINING PROGRAM

## 2025-05-20 PROCEDURE — 1159F MED LIST DOCD IN RCRD: CPT | Mod: CPTII,S$GLB,, | Performed by: STUDENT IN AN ORGANIZED HEALTH CARE EDUCATION/TRAINING PROGRAM

## 2025-05-20 PROCEDURE — 99999 PR PBB SHADOW E&M-EST. PATIENT-LVL III: CPT | Mod: PBBFAC,,, | Performed by: STUDENT IN AN ORGANIZED HEALTH CARE EDUCATION/TRAINING PROGRAM

## 2025-05-20 PROCEDURE — 99213 OFFICE O/P EST LOW 20 MIN: CPT | Mod: S$GLB,,, | Performed by: STUDENT IN AN ORGANIZED HEALTH CARE EDUCATION/TRAINING PROGRAM

## 2025-05-20 NOTE — PROGRESS NOTES
"[place] - Urology   Clinic Note    Subjective:     Chief Complaint: Follow-up    History of Present Illness    CHIEF COMPLAINT:  Yokasta presents today for follow up of kidney stones.    She underwent left ureteroscopy 01/29/2025 for a large renal pelvis stone but an additional upper lateral calyx with a renal stone which required a laser infundibulotomy.  She was admitted postoperatively and diagnosed with pyelonephritis.  She presents today for follow up with a renal ultrasound and KUB from 05/16/2025 showing no evidence of hydronephrosis bilaterally, mild pelviectasis on the right.  Suggestion of small nonobstructing renal stones on the left.    KIDNEY STONE HISTORY:  She reports passing stones, including dust and small pieces, in the week following her return home after stent removal. Post-stent removal symptoms persisted for approximately 5 days before resolution. She denies current pain, burning sensation, or flank pain.    Past medical, family, surgical and social history reviewed as documented in chart with pertinent positive medical, family, surgical and social history detailed in HPI.    A review systems was conducted with pertinent positive and negative findings documented in HPI.    Objective:     Estimated body mass index is 32.95 kg/m² as calculated from the following:    Height as of this encounter: 5' 9" (1.753 m).    Weight as of this encounter: 101.2 kg (223 lb 1.7 oz).    Vital Signs (Most Recent)       General: No acute distress, well developed.   Head: Normocephalic, atraumatic  CV: no cyanosis  Lungs: normal respiratory effort, no respiratory distress    Labs reviewed below:  Lab Results   Component Value Date    BUN 8 02/03/2025    CREATININE 0.74 02/03/2025    WBC 10.46 02/03/2025    HGB 12.1 02/03/2025    HCT 36.4 (L) 02/03/2025     02/03/2025    AST 64 (H) 02/03/2025    ALT 76 (H) 02/03/2025    ALKPHOS 80 02/03/2025    ALBUMIN 3.2 (L) 02/03/2025    HGBA1C 5.2 08/01/2024    " "  Assessment:     1. Kidney stones      Plan:     Assessment & Plan    - Reviewed imaging results, noting possible small stones in left kidney but no significant hydronephrosis or blockage.  - Pelviectasis observed is likely not indicative of a blockage, especially given lack of symptoms and can sometimes indicate an extrarenal pelvis.  - Remaining stone fragments are very small and not concerning.  - No further procedures or interventions necessary at this time based on current imaging and asymptomatic status.  - Clarified that US may overestimate the presence of stones compared to CT.    - Informed patient that passing additional small fragments or "dust" is common after treatment for large kidney stones.    Follow up as needed with symptoms         Portions of this note were generated by [employer] and [employer] dictation services    Aaron Galvin MD   "

## 2025-05-21 DIAGNOSIS — F41.1 GAD (GENERALIZED ANXIETY DISORDER): Chronic | ICD-10-CM

## 2025-05-21 RX ORDER — DULOXETIN HYDROCHLORIDE 60 MG/1
60 CAPSULE, DELAYED RELEASE ORAL
Qty: 90 CAPSULE | Refills: 1 | Status: SHIPPED | OUTPATIENT
Start: 2025-05-21

## 2025-05-21 NOTE — TELEPHONE ENCOUNTER
No care due was identified.  [facility] Embedded Care Due Messages. Reference number: [id number].   5/21/2025 2:30:17 AM CDT

## 2025-05-21 NOTE — TELEPHONE ENCOUNTER
Refill Decision Note   Yokasta Terence  is requesting a refill authorization.  Brief Assessment and Rationale for Refill:  Approve     Medication Therapy Plan:  ED 2/2/25 for pyelonephritis (admission to discharge); Pt has followed up with Urology      Extended chart review required: Yes   Comments:     Note composed:12:21 PM 05/21/2025

## 2025-05-27 ENCOUNTER — RESULTS FOLLOW-UP (OUTPATIENT)
Dept: UROLOGY | Facility: CLINIC | Age: 45
End: 2025-05-27

## 2025-05-27 ENCOUNTER — TELEPHONE (OUTPATIENT)
Dept: UROLOGY | Facility: CLINIC | Age: 45
End: 2025-05-27

## 2025-05-27 ENCOUNTER — OFFICE VISIT (OUTPATIENT)
Dept: UROLOGY | Facility: CLINIC | Age: 45
End: 2025-05-27
Payer: COMMERCIAL

## 2025-05-27 VITALS — BODY MASS INDEX: 33.63 KG/M2 | WEIGHT: 227.06 LBS | HEIGHT: 69 IN

## 2025-05-27 DIAGNOSIS — N20.0 KIDNEY STONES: Primary | ICD-10-CM

## 2025-05-27 DIAGNOSIS — R10.9 FLANK PAIN: ICD-10-CM

## 2025-05-27 LAB
BACTERIA #/AREA URNS HPF: ABNORMAL /HPF
BILIRUBIN, UA POC OHS: NEGATIVE
BLOOD, UA POC OHS: ABNORMAL
CLARITY, UA POC OHS: CLEAR
COLOR, UA POC OHS: YELLOW
GLUCOSE, UA POC OHS: NEGATIVE
KETONES, UA POC OHS: NEGATIVE
LEUKOCYTES, UA POC OHS: NEGATIVE
MICROSCOPIC COMMENT: ABNORMAL
NITRITE, UA POC OHS: NEGATIVE
PH, UA POC OHS: 7
PROTEIN, UA POC OHS: NEGATIVE
RBC #/AREA URNS HPF: >100 /HPF (ref 0–4)
SPECIFIC GRAVITY, UA POC OHS: 1.01
SQUAMOUS #/AREA URNS HPF: 2 /HPF
UROBILINOGEN, UA POC OHS: 0.2
WBC #/AREA URNS HPF: 4 /HPF (ref 0–5)

## 2025-05-27 PROCEDURE — 87086 URINE CULTURE/COLONY COUNT: CPT

## 2025-05-27 PROCEDURE — 99213 OFFICE O/P EST LOW 20 MIN: CPT | Mod: S$GLB,,,

## 2025-05-27 PROCEDURE — 3008F BODY MASS INDEX DOCD: CPT | Mod: CPTII,S$GLB,,

## 2025-05-27 PROCEDURE — 1159F MED LIST DOCD IN RCRD: CPT | Mod: CPTII,S$GLB,,

## 2025-05-27 PROCEDURE — 1160F RVW MEDS BY RX/DR IN RCRD: CPT | Mod: CPTII,S$GLB,,

## 2025-05-27 PROCEDURE — 81003 URINALYSIS AUTO W/O SCOPE: CPT | Mod: QW,S$GLB,,

## 2025-05-27 PROCEDURE — 99999 PR PBB SHADOW E&M-EST. PATIENT-LVL III: CPT | Mod: PBBFAC,,,

## 2025-05-27 PROCEDURE — 81000 URINALYSIS NONAUTO W/SCOPE: CPT | Mod: PO

## 2025-05-27 RX ORDER — HYDROCODONE BITARTRATE AND ACETAMINOPHEN 5; 325 MG/1; MG/1
1 TABLET ORAL EVERY 6 HOURS PRN
Qty: 28 TABLET | Refills: 0 | Status: SHIPPED | OUTPATIENT
Start: 2025-05-27 | End: 2025-06-03

## 2025-05-27 RX ORDER — PHENAZOPYRIDINE HYDROCHLORIDE 200 MG/1
200 TABLET, FILM COATED ORAL 3 TIMES DAILY PRN
Qty: 30 TABLET | Refills: 0 | Status: SHIPPED | OUTPATIENT
Start: 2025-05-27 | End: 2025-06-06

## 2025-05-27 RX ORDER — TAMSULOSIN HYDROCHLORIDE 0.4 MG/1
0.4 CAPSULE ORAL DAILY
Qty: 30 CAPSULE | Refills: 1 | Status: SHIPPED | OUTPATIENT
Start: 2025-05-27 | End: 2026-05-27

## 2025-05-27 RX ORDER — KETOROLAC TROMETHAMINE 10 MG/1
10 TABLET, FILM COATED ORAL EVERY 6 HOURS
Qty: 20 TABLET | Refills: 0 | Status: SHIPPED | OUTPATIENT
Start: 2025-05-27 | End: 2025-06-01

## 2025-05-27 NOTE — TELEPHONE ENCOUNTER
----- Message from Micaela Martínez NP sent at 5/27/2025  3:27 PM CDT -----  No stones seen moving outside of the kidneys to explain pt's pain    No stones on the right, small stones on the left  ----- Message -----  From: [facility] Results In  Sent: 5/27/2025  11:13 AM CDT  To: Micaela Martínez NP

## 2025-05-27 NOTE — PROGRESS NOTES
[facility] Note  Staff: Micaela Martínez FNP-C    PCP: MD Moon    Chief Complaint: flank pain    Subjective:        HPI: Yokasta Pratt is a 44 y.o. female new patient to me presents today for evaluation of flank pain.  She accompanied by her .  She is established to all this and has been followed by Dr. Galvin.  She has a history of kidney stones and recently underwent ureteroscopy with stent placement.  She states beginning 2-3 days ago she started having right flank pain.  She denies dysuria, hematuria, fever, and difficulty urinating.    Questions asked pt during office visit today:  Urgency:No, incontinence with urgency? No;   DysuriaNo  Gross HematuriaNo  History of UTI: Yes     History of Kidney Stones?:  yes    Constipation issues?:  no    REVIEW OF SYSTEMS:  Review of Systems   Constitutional: Negative.  Negative for chills and fever.   Gastrointestinal: Negative.  Negative for abdominal pain, constipation, diarrhea, nausea and vomiting.   Genitourinary:  Positive for flank pain. Negative for dysuria, frequency, hematuria and urgency.   Musculoskeletal:  Positive for back pain.       PMHx:  Past Medical History:   Diagnosis Date    Anxiety     Carpal tunnel syndrome on right 07/19/2022    s/p surgery    Chronic maxillary sinusitis 4/20/2023    Chronic tonsillitis 06/09/2014    Dichorionic diamniotic twin pregnancy 11/28/2017    Endometriosis     Gastroesophageal reflux disease 5/9/2023    Greater trochanteric bursitis 09/12/2014    Hypertension affecting pregnancy 01/12/2018    Infertility, female     Mitral valve prolapse 10/31/2018    Baxter's neuroma of left foot 11/15/2016    Baxter's neuroma of second interspace of right foot 12/01/2020    Palpitations 10/31/2018    PCOS (polycystic ovarian syndrome)     PONV (postoperative nausea and vomiting)     Pre-eclampsia 02/05/2018    PVC's (premature ventricular contractions)     2022    Thyromegaly 10/31/2018        PSHx:  Past Surgical History:   Procedure Laterality Date    CARPAL TUNNEL RELEASE Right 2022    Procedure: Right carpal tunnel release;  Surgeon: Junior Kaiser MD;  Location: [facility] OR;  Service: Orthopedics;  Laterality: Right;     SECTION      CHOLECYSTECTOMY      approx     CYSTOSCOPY W/ RETROGRADES Left 2025    Procedure: CYSTOSCOPY, WITH RETROGRADE PYELOGRAM;  Surgeon: Aaron Galvin MD;  Location: [facility] OR;  Service: Urology;  Laterality: Left;    CYSTOSCOPY WITH CALCULUS EXTRACTION Left 2025    Procedure: CYSTOSCOPY, WITH CALCULUS REMOVAL;  Surgeon: Aaron Galvin MD;  Location: [facility] OR;  Service: Urology;  Laterality: Left;    CYSTOURETEROSCOPY, WITH HOLMIUM LASER LITHOTRIPSY OF URETERAL CALCULUS AND STENT INSERTION Left 2025    Procedure: CYSTOURETEROSCOPY, WITH thulium LASER LITHOTRIPSY OF URETERAL CALCULUS AND STENT INSERTION;  Surgeon: Aaron aGlvin MD;  Location: [facility] OR;  Service: Urology;  Laterality: Left;  left URS thulium    DILATION AND CURETTAGE OF UTERUS      SURGICAL REMOVAL OF ENDOMETRIOSIS      laparoscopy    TONSILLECTOMY      WISDOM TOOTH EXTRACTION         Fam Hx:   malignancies: No , gyn malignancies: Yes - maternal grandmother breast cancer   kidney stones: No     Soc Hx:  , lives in [place]    Allergies:  Codeine    Medications: reviewed     Objective:   There were no vitals filed for this visit.    Physical Exam  Constitutional:       Appearance: Normal appearance.   Pulmonary:      Effort: Pulmonary effort is normal.   Abdominal:      General: There is no distension.      Palpations: Abdomen is soft.      Tenderness: There is no abdominal tenderness. There is no right CVA tenderness or left CVA tenderness.   Musculoskeletal:         General: Normal range of motion.      Cervical back: Normal range of motion.   Skin:     General: Skin is warm.   Neurological:      Mental Status: She is oriented to person, place, and time.    Psychiatric:         Mood and Affect: Mood normal.         Behavior: Behavior normal.         LABS REVIEW:  UA today:   Color:Clear, Yellow  Spec. Grav.  1.010  PH  7.0  Negative for leukocytes, nitrates, protein, glucose, ketones, urobili, bili  Large blood    Assessment:       1. Kidney stones    2. Flank pain          Plan:      CT RSS ordered and scheduled  Urine sent for micro UA and culture    F/u per plan    [facility]: Active    JOSEPHINE Pedro

## 2025-05-29 ENCOUNTER — TELEPHONE (OUTPATIENT)
Dept: UROLOGY | Facility: CLINIC | Age: 45
End: 2025-05-29
Payer: COMMERCIAL

## 2025-05-29 ENCOUNTER — PATIENT MESSAGE (OUTPATIENT)
Dept: UROLOGY | Facility: CLINIC | Age: 45
End: 2025-05-29
Payer: COMMERCIAL

## 2025-05-29 DIAGNOSIS — N30.00 ACUTE CYSTITIS WITHOUT HEMATURIA: Primary | ICD-10-CM

## 2025-05-29 LAB — BACTERIA UR CULT: ABNORMAL

## 2025-05-29 RX ORDER — AMOXICILLIN 500 MG/1
500 TABLET, FILM COATED ORAL 2 TIMES DAILY
Qty: 10 TABLET | Refills: 0 | Status: SHIPPED | OUTPATIENT
Start: 2025-05-29 | End: 2025-06-03

## 2025-05-29 NOTE — TELEPHONE ENCOUNTER
----- Message from Micaela Martínez NP sent at 5/29/2025  8:46 AM CDT -----  Group B strep seen in urine which is vaginal bacteria and likely a contaminant. If she develops UTI symptoms I did send in amoxicillin  ----- Message -----  From: Aj Lundberg MA  Sent: 5/27/2025   9:36 AM CDT  To: Micaela Martínez NP

## 2025-07-08 ENCOUNTER — OFFICE VISIT (OUTPATIENT)
Dept: FAMILY MEDICINE | Facility: CLINIC | Age: 45
End: 2025-07-08
Payer: COMMERCIAL

## 2025-07-08 VITALS
OXYGEN SATURATION: 99 % | HEIGHT: 69 IN | BODY MASS INDEX: 33.52 KG/M2 | SYSTOLIC BLOOD PRESSURE: 128 MMHG | WEIGHT: 226.31 LBS | DIASTOLIC BLOOD PRESSURE: 80 MMHG | HEART RATE: 94 BPM

## 2025-07-08 DIAGNOSIS — F41.1 GAD (GENERALIZED ANXIETY DISORDER): Chronic | ICD-10-CM

## 2025-07-08 DIAGNOSIS — N92.1 MENORRHAGIA WITH IRREGULAR CYCLE: ICD-10-CM

## 2025-07-08 DIAGNOSIS — E28.2 PCOS (POLYCYSTIC OVARIAN SYNDROME): Chronic | ICD-10-CM

## 2025-07-08 DIAGNOSIS — Z00.00 PREVENTATIVE HEALTH CARE: Primary | ICD-10-CM

## 2025-07-08 DIAGNOSIS — Z13.220 ENCOUNTER FOR SCREENING FOR LIPID DISORDER: ICD-10-CM

## 2025-07-08 DIAGNOSIS — Z13.1 ENCOUNTER FOR SCREENING FOR DIABETES MELLITUS: ICD-10-CM

## 2025-07-08 DIAGNOSIS — Z86.39 HISTORY OF THYROID DISEASE: Chronic | ICD-10-CM

## 2025-07-08 PROBLEM — G43.109 MIGRAINE WITH AURA AND WITHOUT STATUS MIGRAINOSUS, NOT INTRACTABLE: Chronic | Status: ACTIVE | Noted: 2024-09-25

## 2025-07-08 PROBLEM — Z98.890 STATUS POST LASER LITHOTRIPSY OF URETERAL CALCULUS: Chronic | Status: ACTIVE | Noted: 2025-02-03

## 2025-07-08 PROCEDURE — 99999 PR PBB SHADOW E&M-EST. PATIENT-LVL IV: CPT | Mod: PBBFAC,,, | Performed by: STUDENT IN AN ORGANIZED HEALTH CARE EDUCATION/TRAINING PROGRAM

## 2025-07-08 PROCEDURE — 1160F RVW MEDS BY RX/DR IN RCRD: CPT | Mod: CPTII,S$GLB,, | Performed by: STUDENT IN AN ORGANIZED HEALTH CARE EDUCATION/TRAINING PROGRAM

## 2025-07-08 PROCEDURE — 99396 PREV VISIT EST AGE 40-64: CPT | Mod: S$GLB,,, | Performed by: STUDENT IN AN ORGANIZED HEALTH CARE EDUCATION/TRAINING PROGRAM

## 2025-07-08 PROCEDURE — 3074F SYST BP LT 130 MM HG: CPT | Mod: CPTII,S$GLB,, | Performed by: STUDENT IN AN ORGANIZED HEALTH CARE EDUCATION/TRAINING PROGRAM

## 2025-07-08 PROCEDURE — 3008F BODY MASS INDEX DOCD: CPT | Mod: CPTII,S$GLB,, | Performed by: STUDENT IN AN ORGANIZED HEALTH CARE EDUCATION/TRAINING PROGRAM

## 2025-07-08 PROCEDURE — 1159F MED LIST DOCD IN RCRD: CPT | Mod: CPTII,S$GLB,, | Performed by: STUDENT IN AN ORGANIZED HEALTH CARE EDUCATION/TRAINING PROGRAM

## 2025-07-08 PROCEDURE — 3079F DIAST BP 80-89 MM HG: CPT | Mod: CPTII,S$GLB,, | Performed by: STUDENT IN AN ORGANIZED HEALTH CARE EDUCATION/TRAINING PROGRAM

## 2025-07-08 PROCEDURE — 99214 OFFICE O/P EST MOD 30 MIN: CPT | Mod: 25,S$GLB,, | Performed by: STUDENT IN AN ORGANIZED HEALTH CARE EDUCATION/TRAINING PROGRAM

## 2025-07-08 NOTE — PROGRESS NOTES
Plan:      Yokasta was seen today for annual exam.    Diagnoses and all orders for this visit:    Preventative health care: Discussed age appropriate preventative healthcare items such as cancer screenings and recommended immunizations. Discussed whether patient is using tobacco, alcohol, or illicit drugs and any concerns were discussed. Discussed maintenance of a healthy weight. Patient queried if she has any additional questions about preventative healthcare and all questions were answered.  -     Hemoglobin A1C; Future  -     Lipid Panel; Future  -     Comprehensive Metabolic Panel; Future  -     CBC Auto Differential; Future    Encounter for screening for diabetes mellitus  -     Hemoglobin A1C; Future  -     Comprehensive Metabolic Panel; Future    Encounter for screening for lipid disorder  -     Lipid Panel; Future    EDNA (generalized anxiety disorder)  -     Comprehensive Metabolic Panel; Future    History of thyroid disease  -     TSH; Future    PCOS (polycystic ovarian syndrome)  -     Testosterone,Total; Future  -     Estrogens, Total; Future  -     Luteinizing Hormone; Future  -     Follicle Stimulating Hormone; Future    Menorrhagia with irregular cycle  -     CBC Auto Differential; Future  -     Iron and TIBC; Future  -     Ferritin; Future      Follow up if symptoms worsen or fail to improve.    Lelsy Mustafa MD  07/08/2025    Subjective:      Patient ID: Yokasta Pratt is a 45 y.o. female    Chief Complaint   Patient presents with    Annual Exam     HPI  45 y.o. female with a PMHx as documented below presents to clinic today for the following:    She has a history of PCOS with previous ultrasound from 3 years ago showing polycystic ovaries without concerning masses. Her menstrual cycles have shortened from 32 to 26-27 days with heavy bleeding lasting 7 days, requiring 5-6 pads daily. She reports breakthrough bleeding affecting clothing and bedding. She previously experienced  "amenorrhea for approximately 100 days while on testosterone and Ozempic, which resolved after discontinuation of these medications.    MIGRAINES:  She reports good response to Emgality, describing it as a "miracle" for migraine management. This month she experienced three unusual post-menstrual migraines. She discontinued ibuprofen in October after previous overuse of up to 16 tablets daily, which was believed to be causing rebound headaches  _________________    Hx of kidney stones s/p cystoureteroscopy w/ stent placement and subsequent removal in January-February 2025. Follows w/ Urology.    Migraine headaches:   - Emgality 120 mg subQ monthly   - Maxalt PRN  - Following w/ Neuro     PCOS:   - Previoux Rx: Ozempic 0.5 mg subQ weekly (stopped 2/2 GI side effects), testosterone replacement, spironolactone (stopped 2/2 lightheadedness), metformin  - Follows with and treatment managed by OBGYN     EDNA:   - Cymbalta 60 mg daily    GERD:   - Esomeprazole 20 mg daily    ROS  Constitutional:  Negative for chills and fever.   Respiratory:  Negative for shortness of breath.    Cardiovascular:  Negative for chest pain.   Gastrointestinal:  Negative for abdominal pain, constipation, diarrhea, nausea and vomiting.     Current Outpatient Medications   Medication Instructions    ascorbic acid (vitamin C) (VITAMIN C) 250 mg, Daily    b complex vitamins tablet 1 tablet, Daily    cyclobenzaprine (FLEXERIL) 10 mg, Oral, 3 times daily PRN    DULoxetine (CYMBALTA) 60 mg, Oral    ELDERBERRY FRUIT ORAL 1 tablet, Daily    esomeprazole (NEXIUM) 20 mg, Oral, Before breakfast    fluticasone propionate (FLONASE) 50 mcg/actuation nasal spray 2 sprays, 2 times daily PRN    galcanezumab-gnlm 120 mg/mL PnIj Inject 1 mL (120 mg total) into the skin every 28 days - Maintenance dose    HYDROcodone-acetaminophen (NORCO) 5-325 mg per tablet 1 tablet, Oral, Every 4 hours PRN    LIDOcaine (LIDODERM) 5 % 1 patch, Transdermal, Daily, Remove & Discard " "patch within 12 hours or as directed by MD    montelukast (SINGULAIR) 10 mg, Nightly    rizatriptan (MAXALT) 5 mg, Oral, 2 times daily PRN    spironolactone (ALDACTONE) 100 mg, Nightly    tamsulosin (FLOMAX) 0.4 mg, Oral, Daily, Take at bedtime    vitamin D (VITAMIN D3) 1,000 Units, Daily    vitamin E 400 Units, 2 times daily      Past Medical History:   Diagnosis Date    Anxiety     Carpal tunnel syndrome on right 07/19/2022    s/p surgery    Chronic maxillary sinusitis 4/20/2023    Chronic tonsillitis 06/09/2014    Dichorionic diamniotic twin pregnancy 11/28/2017    Endometriosis     Gastroesophageal reflux disease 5/9/2023    Greater trochanteric bursitis 09/12/2014    Hypertension affecting pregnancy 01/12/2018    Infertility, female     Mitral valve prolapse 10/31/2018    Baxter's neuroma of left foot 11/15/2016    Baxter's neuroma of second interspace of right foot 12/01/2020    Palpitations 10/31/2018    PCOS (polycystic ovarian syndrome)     PONV (postoperative nausea and vomiting)     Pre-eclampsia 02/05/2018    PVC's (premature ventricular contractions)     2022    Thyromegaly 10/31/2018      Objective:      Vitals:    07/08/25 1052   BP: 128/80   BP Location: Right arm   Patient Position: Sitting   Pulse: 94   SpO2: 99%   Weight: 102.7 kg (226 lb 4.8 oz)   Height: 5' 9" (1.753 m)     Body mass index is 33.42 kg/m².    Physical Exam   Constitutional:       General: No acute distress.  HENT:      Head: Normocephalic and atraumatic.   Pulmonary:      Effort: Pulmonary effort is normal. No respiratory distress.   Neurological:      General: No focal deficit present.      Mental Status: Alert and oriented to person, place, and time. Mental status is at baseline.    Assessment:       1. Preventative health care    2. Encounter for screening for diabetes mellitus    3. Encounter for screening for lipid disorder    4. EDNA (generalized anxiety disorder)    5. History of thyroid disease    6. PCOS (polycystic " ovarian syndrome)    7. Menorrhagia with irregular cycle        Lesly Mustafa MD  [facility]  Office: [phone]   Fax: [phone]  07/08/2025      Disclaimer: This note was partly generated using dictation software which may occasionally result in transcription errors.    Total time spent on this encounter includes face to face time and non-face to face time preparing to see the patient (eg, review of tests), obtaining and/or reviewing separately obtained history, documenting clinical information in the electronic or other health record, independently interpreting results, and communicating results to the patient/family/caregiver, or care coordinator.      Visit today included increased complexity associated with the care of the episodic problem (see above) addressed and managing the longitudinal care of the patient due to the serious and/or complex managed problem(s) (see above).

## 2025-07-11 ENCOUNTER — LAB VISIT (OUTPATIENT)
Dept: LAB | Facility: HOSPITAL | Age: 45
End: 2025-07-11
Attending: STUDENT IN AN ORGANIZED HEALTH CARE EDUCATION/TRAINING PROGRAM
Payer: COMMERCIAL

## 2025-07-11 DIAGNOSIS — N92.1 MENORRHAGIA WITH IRREGULAR CYCLE: ICD-10-CM

## 2025-07-11 DIAGNOSIS — Z13.220 ENCOUNTER FOR SCREENING FOR LIPID DISORDER: ICD-10-CM

## 2025-07-11 DIAGNOSIS — Z13.1 ENCOUNTER FOR SCREENING FOR DIABETES MELLITUS: ICD-10-CM

## 2025-07-11 DIAGNOSIS — Z86.39 HISTORY OF THYROID DISEASE: Chronic | ICD-10-CM

## 2025-07-11 DIAGNOSIS — F41.1 GAD (GENERALIZED ANXIETY DISORDER): Chronic | ICD-10-CM

## 2025-07-11 DIAGNOSIS — E28.2 PCOS (POLYCYSTIC OVARIAN SYNDROME): Chronic | ICD-10-CM

## 2025-07-11 DIAGNOSIS — Z00.00 PREVENTATIVE HEALTH CARE: ICD-10-CM

## 2025-07-11 LAB
ABSOLUTE EOSINOPHIL (OHS): 0.21 K/UL
ABSOLUTE MONOCYTE (OHS): 0.52 K/UL (ref 0.3–1)
ABSOLUTE NEUTROPHIL COUNT (OHS): 5.58 K/UL (ref 1.8–7.7)
ALBUMIN SERPL BCP-MCNC: 4.2 G/DL (ref 3.5–5.2)
ALP SERPL-CCNC: 69 UNIT/L (ref 40–150)
ALT SERPL W/O P-5'-P-CCNC: 31 UNIT/L (ref 10–44)
ANION GAP (OHS): 10 MMOL/L (ref 8–16)
AST SERPL-CCNC: 23 UNIT/L (ref 11–45)
BASOPHILS # BLD AUTO: 0.04 K/UL
BASOPHILS NFR BLD AUTO: 0.5 %
BILIRUB SERPL-MCNC: 0.8 MG/DL (ref 0.1–1)
BUN SERPL-MCNC: 13 MG/DL (ref 6–20)
CALCIUM SERPL-MCNC: 9.6 MG/DL (ref 8.7–10.5)
CHLORIDE SERPL-SCNC: 104 MMOL/L (ref 95–110)
CHOLEST SERPL-MCNC: 223 MG/DL (ref 120–199)
CHOLEST/HDLC SERPL: 4.4 {RATIO} (ref 2–5)
CO2 SERPL-SCNC: 25 MMOL/L (ref 23–29)
CREAT SERPL-MCNC: 0.7 MG/DL (ref 0.5–1.4)
EAG (OHS): 105 MG/DL (ref 68–131)
ERYTHROCYTE [DISTWIDTH] IN BLOOD BY AUTOMATED COUNT: 13.2 % (ref 11.5–14.5)
FERRITIN SERPL-MCNC: 57 NG/ML (ref 20–300)
FSH SERPL-ACNC: 3.6 MIU/ML
GFR SERPLBLD CREATININE-BSD FMLA CKD-EPI: >60 ML/MIN/1.73/M2
GLUCOSE SERPL-MCNC: 98 MG/DL (ref 70–110)
HBA1C MFR BLD: 5.3 % (ref 4–5.6)
HCT VFR BLD AUTO: 43.5 % (ref 37–48.5)
HDLC SERPL-MCNC: 51 MG/DL (ref 40–75)
HDLC SERPL: 22.9 % (ref 20–50)
HGB BLD-MCNC: 14.4 GM/DL (ref 12–16)
IMM GRANULOCYTES # BLD AUTO: 0.02 K/UL (ref 0–0.04)
IMM GRANULOCYTES NFR BLD AUTO: 0.2 % (ref 0–0.5)
IRON SATN MFR SERPL: 18 % (ref 20–50)
IRON SERPL-MCNC: 76 UG/DL (ref 30–160)
LDLC SERPL CALC-MCNC: 153 MG/DL (ref 63–159)
LH SERPL-ACNC: 4.1 MIU/ML
LYMPHOCYTES # BLD AUTO: 1.94 K/UL (ref 1–4.8)
MCH RBC QN AUTO: 29 PG (ref 27–31)
MCHC RBC AUTO-ENTMCNC: 33.1 G/DL (ref 32–36)
MCV RBC AUTO: 88 FL (ref 82–98)
NONHDLC SERPL-MCNC: 172 MG/DL
NUCLEATED RBC (/100WBC) (OHS): 0 /100 WBC
PLATELET # BLD AUTO: 267 K/UL (ref 150–450)
PMV BLD AUTO: 10.8 FL (ref 9.2–12.9)
POTASSIUM SERPL-SCNC: 4.4 MMOL/L (ref 3.5–5.1)
PROT SERPL-MCNC: 6.9 GM/DL (ref 6–8.4)
RBC # BLD AUTO: 4.96 M/UL (ref 4–5.4)
RELATIVE EOSINOPHIL (OHS): 2.5 %
RELATIVE LYMPHOCYTE (OHS): 23.3 % (ref 18–48)
RELATIVE MONOCYTE (OHS): 6.3 % (ref 4–15)
RELATIVE NEUTROPHIL (OHS): 67.2 % (ref 38–73)
SODIUM SERPL-SCNC: 139 MMOL/L (ref 136–145)
TESTOST SERPL-MCNC: 14 NG/DL (ref 5–73)
TIBC SERPL-MCNC: 414 UG/DL (ref 250–450)
TRANSFERRIN SERPL-MCNC: 280 MG/DL (ref 200–375)
TRIGL SERPL-MCNC: 95 MG/DL (ref 30–150)
TSH SERPL-ACNC: 1.47 UIU/ML (ref 0.4–4)
WBC # BLD AUTO: 8.31 K/UL (ref 3.9–12.7)

## 2025-07-11 PROCEDURE — 83001 ASSAY OF GONADOTROPIN (FSH): CPT

## 2025-07-11 PROCEDURE — 85025 COMPLETE CBC W/AUTO DIFF WBC: CPT

## 2025-07-11 PROCEDURE — 36415 COLL VENOUS BLD VENIPUNCTURE: CPT | Mod: PO

## 2025-07-11 PROCEDURE — 84403 ASSAY OF TOTAL TESTOSTERONE: CPT

## 2025-07-11 PROCEDURE — 80053 COMPREHEN METABOLIC PANEL: CPT

## 2025-07-11 PROCEDURE — 84443 ASSAY THYROID STIM HORMONE: CPT

## 2025-07-11 PROCEDURE — 82728 ASSAY OF FERRITIN: CPT

## 2025-07-11 PROCEDURE — 83002 ASSAY OF GONADOTROPIN (LH): CPT

## 2025-07-11 PROCEDURE — 82672 ASSAY OF ESTROGEN: CPT

## 2025-07-11 PROCEDURE — 83540 ASSAY OF IRON: CPT

## 2025-07-11 PROCEDURE — 80061 LIPID PANEL: CPT

## 2025-07-11 PROCEDURE — 83036 HEMOGLOBIN GLYCOSYLATED A1C: CPT

## 2025-07-16 ENCOUNTER — RESULTS FOLLOW-UP (OUTPATIENT)
Dept: FAMILY MEDICINE | Facility: CLINIC | Age: 45
End: 2025-07-16
Payer: COMMERCIAL

## 2025-07-16 DIAGNOSIS — E61.1 IRON DEFICIENCY: Primary | ICD-10-CM

## 2025-07-16 LAB
ESTRADIOL: 62 PG/ML
ESTROGEN SERPL-MCNC: 97 PG/ML
ESTRONE PG/ML: 35 PG/ML

## 2025-07-24 ENCOUNTER — LAB VISIT (OUTPATIENT)
Dept: LAB | Facility: HOSPITAL | Age: 45
End: 2025-07-24
Attending: STUDENT IN AN ORGANIZED HEALTH CARE EDUCATION/TRAINING PROGRAM
Payer: COMMERCIAL

## 2025-07-24 DIAGNOSIS — E61.1 IRON DEFICIENCY: ICD-10-CM

## 2025-07-24 LAB
ABSOLUTE EOSINOPHIL (OHS): 0.25 K/UL
ABSOLUTE MONOCYTE (OHS): 0.57 K/UL (ref 0.3–1)
ABSOLUTE NEUTROPHIL COUNT (OHS): 5.81 K/UL (ref 1.8–7.7)
BASOPHILS # BLD AUTO: 0.05 K/UL
BASOPHILS NFR BLD AUTO: 0.5 %
ERYTHROCYTE [DISTWIDTH] IN BLOOD BY AUTOMATED COUNT: 13 % (ref 11.5–14.5)
FERRITIN SERPL-MCNC: 68 NG/ML (ref 20–300)
HCT VFR BLD AUTO: 42.6 % (ref 37–48.5)
HGB BLD-MCNC: 14.1 GM/DL (ref 12–16)
IMM GRANULOCYTES # BLD AUTO: 0.03 K/UL (ref 0–0.04)
IMM GRANULOCYTES NFR BLD AUTO: 0.3 % (ref 0–0.5)
IRON SATN MFR SERPL: 12 % (ref 20–50)
IRON SERPL-MCNC: 48 UG/DL (ref 30–160)
LYMPHOCYTES # BLD AUTO: 2.73 K/UL (ref 1–4.8)
MCH RBC QN AUTO: 28.7 PG (ref 27–31)
MCHC RBC AUTO-ENTMCNC: 33.1 G/DL (ref 32–36)
MCV RBC AUTO: 87 FL (ref 82–98)
NUCLEATED RBC (/100WBC) (OHS): 0 /100 WBC
PLATELET # BLD AUTO: 313 K/UL (ref 150–450)
PMV BLD AUTO: 10.6 FL (ref 9.2–12.9)
RBC # BLD AUTO: 4.91 M/UL (ref 4–5.4)
RELATIVE EOSINOPHIL (OHS): 2.6 %
RELATIVE LYMPHOCYTE (OHS): 28.9 % (ref 18–48)
RELATIVE MONOCYTE (OHS): 6 % (ref 4–15)
RELATIVE NEUTROPHIL (OHS): 61.7 % (ref 38–73)
TIBC SERPL-MCNC: 392 UG/DL (ref 250–450)
TRANSFERRIN SERPL-MCNC: 265 MG/DL (ref 200–375)
WBC # BLD AUTO: 9.44 K/UL (ref 3.9–12.7)

## 2025-07-24 PROCEDURE — 36415 COLL VENOUS BLD VENIPUNCTURE: CPT | Mod: PO

## 2025-07-24 PROCEDURE — 82728 ASSAY OF FERRITIN: CPT

## 2025-07-24 PROCEDURE — 85025 COMPLETE CBC W/AUTO DIFF WBC: CPT

## 2025-07-24 PROCEDURE — 84466 ASSAY OF TRANSFERRIN: CPT

## 2025-08-22 DIAGNOSIS — K21.9 GASTROESOPHAGEAL REFLUX DISEASE, UNSPECIFIED WHETHER ESOPHAGITIS PRESENT: ICD-10-CM

## 2025-08-22 RX ORDER — HYDROGEN PEROXIDE 3 %
20 SOLUTION, NON-ORAL MISCELLANEOUS
Qty: 90 CAPSULE | Refills: 3 | Status: SHIPPED | OUTPATIENT
Start: 2025-08-22

## 2025-08-24 ENCOUNTER — PATIENT MESSAGE (OUTPATIENT)
Facility: CLINIC | Age: 45
End: 2025-08-24
Payer: COMMERCIAL

## 2025-08-29 ENCOUNTER — OFFICE VISIT (OUTPATIENT)
Facility: CLINIC | Age: 45
End: 2025-08-29
Payer: COMMERCIAL

## 2025-08-29 DIAGNOSIS — M54.2 CERVICALGIA: ICD-10-CM

## 2025-08-29 DIAGNOSIS — G44.86 CERVICOGENIC HEADACHE: ICD-10-CM

## 2025-08-29 DIAGNOSIS — G43.109 MIGRAINE WITH AURA AND WITHOUT STATUS MIGRAINOSUS, NOT INTRACTABLE: Primary | ICD-10-CM

## (undated) DEVICE — TOWEL OR DISP STRL BLUE 4/PK

## (undated) DEVICE — APPLICATOR CHLORAPREP ORN 26ML

## (undated) DEVICE — Device

## (undated) DEVICE — PAD CAST SPECIALIST STRL 3

## (undated) DEVICE — APPLICATOR CHLORAPREP CLR 10.5

## (undated) DEVICE — BANDAGE ELAS SOFTWRAP ST 3X5YD

## (undated) DEVICE — SUT ETHILON 4-0 PS2 18 BLK

## (undated) DEVICE — GLOVE PROTEXIS LTX MICRO 8

## (undated) DEVICE — FORCEP STRAIGHT DISP

## (undated) DEVICE — TUBING SUC UNIV W/CONN 12FT

## (undated) DEVICE — NDL 27G X 1 1/4

## (undated) DEVICE — DRAPE HAND STERILE

## (undated) DEVICE — SYR 10CC LUER LOCK

## (undated) DEVICE — DRAPE THREE-QTR REINF 53X77IN

## (undated) DEVICE — DRESSING XEROFORM FOIL PK 1X8

## (undated) DEVICE — BANDAGE ESMARK LATEX FREE 4INX

## (undated) DEVICE — SEE L#120831

## (undated) DEVICE — CORD BIPOLAR 12 FOOT

## (undated) DEVICE — DRAPE HALF SURGICAL 40X58IN

## (undated) DEVICE — DRAPE STERI-DRAPE 1000 17X11IN

## (undated) DEVICE — BOWL STERILE LARGE 32OZ

## (undated) DEVICE — GAUZE SPONGE 4X4 12PLY

## (undated) DEVICE — DRAPE U SPLIT SHEET 54X76IN

## (undated) DEVICE — GLOVE PROTEXIS LTX MICRO  7.5

## (undated) DEVICE — ALCOHOL 70% ISOP RUBBING 4OZ

## (undated) DEVICE — TOURNIQUET SB QC DP 18X4IN